# Patient Record
Sex: FEMALE | Race: WHITE | NOT HISPANIC OR LATINO | Employment: UNEMPLOYED | URBAN - METROPOLITAN AREA
[De-identification: names, ages, dates, MRNs, and addresses within clinical notes are randomized per-mention and may not be internally consistent; named-entity substitution may affect disease eponyms.]

---

## 2017-10-23 ENCOUNTER — OFFICE VISIT (OUTPATIENT)
Dept: NEUROLOGY | Facility: MEDICAL CENTER | Age: 57
End: 2017-10-23
Payer: MEDICARE

## 2017-10-23 VITALS
WEIGHT: 218 LBS | SYSTOLIC BLOOD PRESSURE: 104 MMHG | HEART RATE: 88 BPM | RESPIRATION RATE: 16 BRPM | BODY MASS INDEX: 40.12 KG/M2 | OXYGEN SATURATION: 95 % | DIASTOLIC BLOOD PRESSURE: 68 MMHG | HEIGHT: 62 IN | TEMPERATURE: 98.6 F

## 2017-10-23 DIAGNOSIS — G62.9 NEUROPATHY: ICD-10-CM

## 2017-10-23 DIAGNOSIS — G35 MULTIPLE SCLEROSIS (HCC): ICD-10-CM

## 2017-10-23 DIAGNOSIS — N31.9 NEUROGENIC BLADDER: ICD-10-CM

## 2017-10-23 PROCEDURE — 99205 OFFICE O/P NEW HI 60 MIN: CPT | Performed by: PSYCHIATRY & NEUROLOGY

## 2017-10-23 RX ORDER — GABAPENTIN 100 MG/1
300 CAPSULE ORAL
COMMUNITY
End: 2018-01-25 | Stop reason: SDUPTHER

## 2017-10-23 RX ORDER — NITROFURANTOIN 25; 75 MG/1; MG/1
100 CAPSULE ORAL
COMMUNITY
Start: 2017-09-11 | End: 2018-07-05

## 2017-10-23 RX ORDER — GABAPENTIN 600 MG/1
300 TABLET ORAL
COMMUNITY
End: 2018-07-05

## 2017-10-23 RX ORDER — BUPROPION HYDROCHLORIDE 150 MG/1
TABLET ORAL
COMMUNITY
End: 2022-11-23

## 2017-10-23 RX ORDER — SERTRALINE HYDROCHLORIDE 100 MG/1
100 TABLET, FILM COATED ORAL DAILY
COMMUNITY

## 2017-10-23 RX ORDER — SERTRALINE HYDROCHLORIDE 100 MG/1
TABLET, FILM COATED ORAL
COMMUNITY
End: 2018-04-24

## 2017-10-23 RX ORDER — BUPROPION HYDROCHLORIDE 150 MG/1
TABLET ORAL
COMMUNITY
Start: 2014-09-20 | End: 2018-07-05

## 2017-10-23 RX ORDER — LISINOPRIL 20 MG/1
TABLET ORAL
COMMUNITY
End: 2021-05-19

## 2017-10-23 RX ORDER — LISINOPRIL 40 MG/1
TABLET ORAL
COMMUNITY
End: 2018-07-05

## 2017-10-23 RX ORDER — DIPHENOXYLATE HYDROCHLORIDE AND ATROPINE SULFATE 2.5; .025 MG/1; MG/1
1 TABLET ORAL DAILY
COMMUNITY

## 2017-10-23 RX ORDER — LORAZEPAM 0.5 MG/1
0.5 TABLET ORAL
COMMUNITY
End: 2018-01-25 | Stop reason: SDUPTHER

## 2017-10-23 ASSESSMENT — PATIENT HEALTH QUESTIONNAIRE - PHQ9: CLINICAL INTERPRETATION OF PHQ2 SCORE: 0
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
## 2017-10-27 PROBLEM — G35 MULTIPLE SCLEROSIS (HCC): Status: ACTIVE | Noted: 2017-10-27

## 2017-10-27 PROBLEM — N31.9 NEUROGENIC BLADDER: Status: ACTIVE | Noted: 2017-10-27

## 2017-10-27 PROBLEM — G62.9 NEUROPATHY: Status: ACTIVE | Noted: 2017-10-27

## 2017-10-27 NOTE — ASSESSMENT & PLAN NOTE
"Ms. Smith is a 58 yo F with multiple sclerosis since 1997 diagnosed based on multiple clinical attacks and MRI imaging. Her MRI's were unavailable for my review at today's visit, however, she is an established patient of Dr. Yañez at [facility], and I reviewed her outside records through \"Care Everywhere.\" She has been stable on Tysabri for 10 years and remained FLACO Virus antibody negative. We discussed her current relocation plans and she is currently spending about 50% of her time in [place], [place], and the other half in [place], [place], as they transition. We discussed switching her Tysabri infusions to [place] or [place] and at today's visit she jfilled out the TOUCH prescribing which we will submit under my name. Dr. Yañez recently ordered new MRI imaging of the brain and cervical spine. I requested that the patient bring her previous and new MRI images with her to her next visit for my review. She has also requested referrals for a local urologist, Ob/Gyn, and PCP in [place] close to [place], and I told her that I would inquire to my colleagues.     Plan:  1. I will manage patient's Tysabri infusions locally - 300mg/15mL IV q 4 weeks. Next infusion should be given mid-November. She wishes to continue her relationship with Dr. Yañez at [facility], which I fully support.  2. Continue Vitamin D supplementation   3. Annual Ophthalmological exam  4. Brain and c-spine MRI's w/wo contrast. She will bring MRI CD's to next visit  5. Next FLACO Virus antibody test in April 2018  "

## 2017-10-27 NOTE — ASSESSMENT & PLAN NOTE
Residual from previous MS attack. Stable on Neurontin.     Plan:  1. Continue with Neurontin 300mg daily.

## 2018-01-22 ENCOUNTER — OUTPATIENT INFUSION SERVICES (OUTPATIENT)
Dept: ONCOLOGY | Facility: MEDICAL CENTER | Age: 58
End: 2018-01-22
Attending: PSYCHIATRY & NEUROLOGY
Payer: MEDICARE

## 2018-01-22 VITALS
RESPIRATION RATE: 18 BRPM | OXYGEN SATURATION: 95 % | HEIGHT: 61 IN | BODY MASS INDEX: 40.92 KG/M2 | DIASTOLIC BLOOD PRESSURE: 78 MMHG | WEIGHT: 216.71 LBS | SYSTOLIC BLOOD PRESSURE: 149 MMHG | HEART RATE: 92 BPM | TEMPERATURE: 99 F

## 2018-01-22 PROCEDURE — 96413 CHEMO IV INFUSION 1 HR: CPT

## 2018-01-22 PROCEDURE — 700105 HCHG RX REV CODE 258: Performed by: PSYCHIATRY & NEUROLOGY

## 2018-01-22 PROCEDURE — 306780 HCHG STAT FOR TRANSFUSION PER CASE

## 2018-01-22 PROCEDURE — 700111 HCHG RX REV CODE 636 W/ 250 OVERRIDE (IP): Mod: JG | Performed by: PSYCHIATRY & NEUROLOGY

## 2018-01-22 RX ADMIN — NATALIZUMAB 300 MG: 300 INJECTION INTRAVENOUS at 12:20

## 2018-01-22 ASSESSMENT — PAIN SCALES - GENERAL: PAINLEVEL: NO PAIN

## 2018-01-22 NOTE — PROGRESS NOTES
Pt to infusion center for Tysabri infusion.  Pt recently relocated to [place] and will continue to receive monthly infusions here.  Pt last had Tysabri on 11/21/17 per Tysabri Touch website.  Pt oriented to infusion services and plan of care for the day.  PIV established, brisk blood return noted.  Pre-infusion checklist completed.  Pt tolerated infusion without incident.  Pt monitored for one hour post infusion, no reaction noted.  PIV flushed with saline per policy, removed, gauze dressing placed.  Pt left infusion center ambulatory and in good condition.  Returns in four weeks, appointment scheduled.

## 2018-01-25 ENCOUNTER — OFFICE VISIT (OUTPATIENT)
Dept: NEUROLOGY | Facility: MEDICAL CENTER | Age: 58
End: 2018-01-25
Payer: MEDICARE

## 2018-01-25 VITALS
HEIGHT: 62 IN | BODY MASS INDEX: 39.56 KG/M2 | HEART RATE: 99 BPM | WEIGHT: 215 LBS | TEMPERATURE: 97.5 F | OXYGEN SATURATION: 95 %

## 2018-01-25 DIAGNOSIS — F41.9 ANXIETY: ICD-10-CM

## 2018-01-25 DIAGNOSIS — G35 MULTIPLE SCLEROSIS (HCC): ICD-10-CM

## 2018-01-25 DIAGNOSIS — G47.00 INSOMNIA, UNSPECIFIED TYPE: ICD-10-CM

## 2018-01-25 DIAGNOSIS — G62.9 NEUROPATHY: ICD-10-CM

## 2018-01-25 PROCEDURE — 99215 OFFICE O/P EST HI 40 MIN: CPT | Performed by: PSYCHIATRY & NEUROLOGY

## 2018-01-25 RX ORDER — LORAZEPAM 0.5 MG/1
0.5 TABLET ORAL
Qty: 30 TAB | Refills: 3 | Status: SHIPPED | OUTPATIENT
Start: 2018-01-25 | End: 2018-02-24

## 2018-01-25 RX ORDER — GABAPENTIN 100 MG/1
300 CAPSULE ORAL 4 TIMES DAILY
Qty: 90 CAP | Refills: 4 | Status: SHIPPED | OUTPATIENT
Start: 2018-01-25 | End: 2018-07-05

## 2018-01-25 NOTE — ASSESSMENT & PLAN NOTE
Ms. Smith is a 57-year-old woman with a past medical history of multiple sclerosis diagnosed in 1997, previously treated with Avonex, Copaxone, and Rebif, who has been stable on Tysabri for the past 10 years. We reviewed her recent MRI imaging and compared it to the 2015 MRIs at today's visit. There has been no significant change, and there is no evidence of enhancement. She should continue on the Tysabri infusions every 4 weeks, we will repeat her FLACO virus antibody in April 2018. Her next MRI imaging should take place in November 2018.    Plan:  1. Continue Tysabri infusions every 4 weeks  2. Repeat FLACO virus antibody test in April 2018  3. Next MRI November 2018  4. Will need liver function tests within the next 6 months  5. Continue vitamin D supplementation  6. We discussed the importance of fall prevention

## 2018-01-25 NOTE — PROGRESS NOTES
CC: Multiple Sclerosis       HPI:    Radha Smith is a 57 y.o. female who presents today in neurologic follow up for multiple sclerosis. She is accompanied by her  to today's visit.    The patient is currently being treated with Tysabri but denies any undue side effects from the medication. She has been receiving her infusions at [facility], as she does not have a provider in [place]. She denies any new symptoms related to her MS including new areas of numbness/tingling, weakness, or dizziness. She does report some pain in her eyes, and gait imbalance. She denies any falls.    She has had continuing symptoms of neuropathy in her bilateral lower extremities for which she has been taking gabapentin. She's been taking 100 mg twice a day, and does not feel that it is effective. She brings recent MRI imaging on CD to review at today's visit.      MS History:  Diagnosed: 1997   First presentation: right leg numbness.  Disease modifying treatments: Avonex gave nausea switched to Copaxone, Switched to Rebif, Tysabri started 10 years ago.   Former or other neurologist: Dr. Yañez at [facility]. Formerly Dr. Pinedo.  Last attack: 10 years ago.   Last MRI: November 2017  FLACO Virus Antibody: negative on October 11th, 2017    ROS:   Constitutional: No fevers or chills.  Eyes: No blurry vision or eye pain.  ENT: No dysphagia or hearing loss.  Respiratory: No cough or shortness of breath.  Cardiovascular: No chest pain or palpitations.  GI: No nausea, vomiting, or diarrhea.  : No urinary incontinence or dysuria.  Musculoskeletal: No joint swelling or arthralgias.  Skin: No skin rashes.  Neuro: No headaches, dizziness, or tremors.  Endocrine: No heat or cold intolerance. No polydipsia or polyuria.  Psych: No depression or anxiety.  Heme/Lymph: No easy bruising or swollen lymph nodes.  All other review of systems were reviewed and were negative.     Past Medical History:   Past Medical History:   Diagnosis Date   • Asthma     • Depression    • Hypertension    • Multiple sclerosis (CMS-HCC)        Past Surgical History:   Past Surgical History:   Procedure Laterality Date   • ATHROPLASTY     • CHOLECYSTECTOMY     • KNEE ARTHROSCOPY     • PRIMARY C SECTION         Social History:   Social History     Social History   • Marital status:      Spouse name: N/A   • Number of children: N/A   • Years of education: N/A     Occupational History   • Not on file.     Social History Main Topics   • Smoking status: Light Tobacco Smoker     Types: Cigarettes   • Smokeless tobacco: Never Used      Comment: Previously smoked     • Alcohol use 3.0 oz/week     5 Glasses of wine per week   • Drug use: No   • Sexual activity: Not on file     Other Topics Concern   • Not on file     Social History Narrative   • No narrative on file       Family Hx:   Family History   Problem Relation Age of Onset   • Cancer Mother      Breast   • Diabetes Mother    • Other Father      Gout   • Hypertension Brother    • Bipolar disorder Son        Current Medications:   Current Outpatient Prescriptions:   •  lorazepam (ATIVAN) 0.5 MG Tab, Take 1 Tab by mouth every bedtime for 30 days., Disp: 30 Tab, Rfl: 3  •  gabapentin (NEURONTIN) 100 MG Cap, Take 3 Caps by mouth 4 times a day., Disp: 90 Cap, Rfl: 4  •  buPROPion (WELLBUTRIN XL) 150 MG XL tablet, TAKE 2 TABLETS(300MG) BY MOUTH EVERY MORNING, Disp: , Rfl:   •  Cholecalciferol 06148 units Tab, Take 10,000 Units by mouth., Disp: , Rfl:   •  drospirenone-estradiol (ANGELIQ) 0.5-1 MG per tablet, Take  by mouth., Disp: , Rfl:   •  lisinopril (PRINIVIL) 20 MG Tab, Take  by mouth., Disp: , Rfl:   •  Multiple Vitamin (MULTI-VITAMINS) Tab, Take  by mouth., Disp: , Rfl:   •  natalizumab (TYSABRI) 300 MG/15ML Conc, by Intravenous route., Disp: , Rfl:   •  sertraline (ZOLOFT) 100 MG Tab, Take  by mouth., Disp: , Rfl:   •  gabapentin (NEURONTIN) 600 MG tablet, Take 300 mg by mouth., Disp: , Rfl:   •  drospirenone-estradiol  "(ANGELIQ) 0.5-1 MG per tablet, TAKE 1 TABLET BY MOUTH EVERY DAY, Disp: , Rfl:   •  buPROPion (WELLBUTRIN XL) 150 MG XL tablet, Take  by mouth., Disp: , Rfl:   •  drospirenone-estradiol (ANGELIQ) 0.5-1 MG per tablet, Take 1 tablet by mouth., Disp: , Rfl:   •  conjugated estrogen (PREMARIN) 0.625 MG/GM Cream, 0.5 g., Disp: , Rfl:   •  lisinopril (PRINIVIL, ZESTRIL) 40 MG tablet, Take  by mouth., Disp: , Rfl:   •  LORAZEPAM PO, by Intravenous route., Disp: , Rfl:   •  natalizumab (TYSABRI) 300 MG/15ML Conc, by Intravenous route., Disp: , Rfl:   •  nitrofurantoin monohydr macro (MACROBID) 100 MG Cap, Take 100 mg by mouth., Disp: , Rfl:   •  sertraline (ZOLOFT) 100 MG Tab, Take  by mouth., Disp: , Rfl:     Allergies: No Known Allergies      Physical Exam:   Ambulatory Vitals  Vitals  Temperature: 36.4 °C (97.5 °F)  Pulse: 99  Pulse Oximetry: 95 %  Height: 156.2 cm (5' 1.5\")  Weight: 97.5 kg (215 lb)  Encounter Vitals  Temperature: 36.4 °C (97.5 °F)  Temp Source: Temporal  BP Location: Upper Arm  Patient BP Position: Standing  Pulse: 99  Pulse Oximetry: 95 %  Weight: 97.5 kg (215 lb)  How Weight Obtained: Stated Weight  Height: 156.2 cm (5' 1.5\")  BMI (Calculated): 39.97         Constitutional: Well-developed, well-nourished, good hygiene. Appears stated age.  Cardiovascular: RRR, with no murmurs, rubs or gallops. No carotid bruits. No peripheral edema, pedal pulses intact.   Respiratory: Lungs CTA B/L, no W/R/R.   Skin: Warm, dry, intact. No rashes observed.  Eyes:               Funduscopic: Optic discs flat with no evidence of papilledema. Bilateral temporal pallor of optic discs.   Neurologic:              Mental Status: Awake, alert, oriented x 3.              Speech: Scanning speech pattern.              Memory: Able to recall 3 words at 1 minute and 5 minutes. Able to recall recent and remote events accurately.               Concentration: Attentive. Able to focus on history and follow multi-step commands.           "    Fund of Knowledge: Appropriate.              Cranial Nerves:                          CN II: PERRL. No afferent pupillary defect.                          CN III: Good eye closure, EOMI.                           CN IV: EOMI                          CN V: Facial sensation intact and symmetric.                           CN VI: EOMI                          CN VII: No facial asymmetry.                           CN VIII: Hearing intact.                           CN IX and X: Palate elevates symmetrically. Normal gag reflex.                          CN XI: Symmetric shoulder shrug.                           CN XII: Tongue midline.               Sensory: Did not test in lower extremities at today's visit. Intact to light touch in the bilateral arms.              Coordination: Decreased fine finger movements on the right. Heel to shin intact.               DTR's: 2+ throughout without clonus.               Babinski: Toes downgoing bilaterally.              Romberg: Negative.              Movements: No tremors observed.   Musculoskeletal:               Strength: 4/5 in right finger flexors, otherwise 5/5 upper and lower extremities bilaterally.              Gait: Steady, narrow based.               Tone: Normal bulk and tone.              Joints: No swelling.       Imaging:   Today I reviewed the patient's most recent MRI images with her in the examination room. I explained basic terminology of MRI's, verbalized my assessment, and answered her questions. We compared her brain MRIs from May 2015 to her more recent MRI imaging done November 2017.    MRI's of the brain with and without contrast showed severe confluent areas of white matter change mainly in the periventricular areas, without enhancement on both studies.      Assessment/Plan:  Multiple sclerosis (CMS-[facility])  Ms. Smith is a 57-year-old woman with a past medical history of multiple sclerosis diagnosed in 1997, previously treated with Avonex, Copaxone, and  [place], who has been stable on Tysabri for the past 10 years. We reviewed her recent MRI imaging and compared it to the 2015 MRIs at today's visit. There has been no significant change, and there is no evidence of enhancement. She should continue on the Tysabri infusions every 4 weeks, we will repeat her FLACO virus antibody in April 2018. Her next MRI imaging should take place in November 2018.    Plan:  1. Continue Tysabri infusions every 4 weeks  2. Repeat FLACO virus antibody test in April 2018  3. Next MRI November 2018  4. Will need liver function tests within the next 6 months  5. Continue vitamin D supplementation  6. We discussed the importance of fall prevention    Neuropathy (CMS-HCC)  We discussed her gabapentin use, she is currently under very small dose at 100 mg twice per day. I advised her to increase to 200 mg twice a day, and refilled her prescription sent electronically to her pharmacy.    Plan:  1. Gabapentin 200 mg twice a day    Anxiety  I have agreed to take over her lorazepam prescription. She is taking 0.5 mg at bedtime daily.    Plan:  1. Lorazepam 0.5 mg daily at bedtime. Paper prescription provided to the patient with 2 refills.        Greater than 50% of this 40 minute face to face follow up encounter was devoted to disease state counseling on Multiple Sclerosis and coordination of care. During today's encounter we discussed available treatment options and their individual side effect profiles. (see above assessment and plan for further details of discussion).

## 2018-01-25 NOTE — ASSESSMENT & PLAN NOTE
I have agreed to take over her lorazepam prescription. She is taking 0.5 mg at bedtime daily.    Plan:  1. Lorazepam 0.5 mg daily at bedtime. Paper prescription provided to the patient with 2 refills.

## 2018-01-25 NOTE — ASSESSMENT & PLAN NOTE
We discussed her gabapentin use, she is currently under very small dose at 100 mg twice per day. I advised her to increase to 200 mg twice a day, and refilled her prescription sent electronically to her pharmacy.    Plan:  1. Gabapentin 200 mg twice a day

## 2018-02-13 ENCOUNTER — TELEPHONE (OUTPATIENT)
Dept: NEUROLOGY | Facility: MEDICAL CENTER | Age: 58
End: 2018-02-13

## 2018-02-19 ENCOUNTER — OUTPATIENT INFUSION SERVICES (OUTPATIENT)
Dept: ONCOLOGY | Facility: MEDICAL CENTER | Age: 58
End: 2018-02-19
Attending: PSYCHIATRY & NEUROLOGY
Payer: MEDICARE

## 2018-02-19 VITALS
OXYGEN SATURATION: 98 % | HEIGHT: 61 IN | SYSTOLIC BLOOD PRESSURE: 136 MMHG | RESPIRATION RATE: 18 BRPM | DIASTOLIC BLOOD PRESSURE: 79 MMHG | WEIGHT: 217.15 LBS | HEART RATE: 77 BPM | BODY MASS INDEX: 41 KG/M2 | TEMPERATURE: 98 F

## 2018-02-19 PROCEDURE — 700105 HCHG RX REV CODE 258: Performed by: PSYCHIATRY & NEUROLOGY

## 2018-02-19 PROCEDURE — 700111 HCHG RX REV CODE 636 W/ 250 OVERRIDE (IP): Mod: JG | Performed by: PSYCHIATRY & NEUROLOGY

## 2018-02-19 PROCEDURE — 96413 CHEMO IV INFUSION 1 HR: CPT

## 2018-02-19 PROCEDURE — 306780 HCHG STAT FOR TRANSFUSION PER CASE

## 2018-02-19 RX ADMIN — NATALIZUMAB 300 MG: 300 INJECTION INTRAVENOUS at 11:35

## 2018-02-19 ASSESSMENT — PAIN SCALES - GENERAL: PAINLEVEL: NO PAIN

## 2018-02-20 NOTE — PROGRESS NOTES
Late entry for 1337:  Tysabri infusion completed without incident.  Line flushed clear with normal saline.  PIV flushed with normal saline.  Pt observed for 1 hour post infusion per protocol.  No adverse effects observed or expressed.  PIV d/c'd.  Pressure dressing applied.  Pt released to self care in no apparent distress after completion of treatment, ambulatory.  Pt to return in 4 weeks; next appointment scheduled.

## 2018-03-19 ENCOUNTER — OUTPATIENT INFUSION SERVICES (OUTPATIENT)
Dept: ONCOLOGY | Facility: MEDICAL CENTER | Age: 58
End: 2018-03-19
Attending: PSYCHIATRY & NEUROLOGY
Payer: MEDICARE

## 2018-03-19 VITALS
HEART RATE: 83 BPM | SYSTOLIC BLOOD PRESSURE: 136 MMHG | DIASTOLIC BLOOD PRESSURE: 89 MMHG | WEIGHT: 219.14 LBS | TEMPERATURE: 98.4 F | OXYGEN SATURATION: 98 % | BODY MASS INDEX: 41.37 KG/M2 | HEIGHT: 61 IN | RESPIRATION RATE: 18 BRPM

## 2018-03-19 PROCEDURE — 306780 HCHG STAT FOR TRANSFUSION PER CASE

## 2018-03-19 PROCEDURE — 700105 HCHG RX REV CODE 258: Performed by: PSYCHIATRY & NEUROLOGY

## 2018-03-19 PROCEDURE — 96413 CHEMO IV INFUSION 1 HR: CPT

## 2018-03-19 PROCEDURE — 700111 HCHG RX REV CODE 636 W/ 250 OVERRIDE (IP): Mod: JG | Performed by: PSYCHIATRY & NEUROLOGY

## 2018-03-19 RX ADMIN — NATALIZUMAB 300 MG: 300 INJECTION INTRAVENOUS at 10:42

## 2018-03-19 ASSESSMENT — PAIN SCALES - GENERAL: PAINLEVEL: NO PAIN

## 2018-04-18 ENCOUNTER — APPOINTMENT (OUTPATIENT)
Dept: ONCOLOGY | Facility: MEDICAL CENTER | Age: 58
End: 2018-04-18
Attending: PSYCHIATRY & NEUROLOGY
Payer: MEDICARE

## 2018-04-24 ENCOUNTER — OUTPATIENT INFUSION SERVICES (OUTPATIENT)
Dept: ONCOLOGY | Facility: MEDICAL CENTER | Age: 58
End: 2018-04-24
Attending: PSYCHIATRY & NEUROLOGY
Payer: MEDICARE

## 2018-04-24 VITALS
HEART RATE: 83 BPM | TEMPERATURE: 98.7 F | OXYGEN SATURATION: 94 % | HEIGHT: 61 IN | DIASTOLIC BLOOD PRESSURE: 71 MMHG | BODY MASS INDEX: 41.91 KG/M2 | WEIGHT: 222 LBS | RESPIRATION RATE: 18 BRPM | SYSTOLIC BLOOD PRESSURE: 119 MMHG

## 2018-04-24 PROCEDURE — 306780 HCHG STAT FOR TRANSFUSION PER CASE

## 2018-04-24 PROCEDURE — 700105 HCHG RX REV CODE 258: Performed by: PSYCHIATRY & NEUROLOGY

## 2018-04-24 PROCEDURE — 96413 CHEMO IV INFUSION 1 HR: CPT

## 2018-04-24 PROCEDURE — 700111 HCHG RX REV CODE 636 W/ 250 OVERRIDE (IP): Mod: JG | Performed by: PSYCHIATRY & NEUROLOGY

## 2018-04-24 RX ADMIN — NATALIZUMAB 300 MG: 300 INJECTION INTRAVENOUS at 14:47

## 2018-04-24 ASSESSMENT — PAIN SCALES - GENERAL: PAINLEVEL: NO PAIN

## 2018-04-25 NOTE — PROGRESS NOTES
Patient presents for Tysabri infusion. Touch questions completed on-line. IV started, flushes well with brisk blood return. Tysabri infused over one hour. Patient observed for one hour after infusion and remains stable. IV discontinued, catheter intact, compression dressing to site. Patient scheduled for next appointment and released in no acute distress.

## 2018-04-30 ENCOUNTER — OFFICE VISIT (OUTPATIENT)
Dept: NEUROLOGY | Facility: MEDICAL CENTER | Age: 58
End: 2018-04-30
Payer: MEDICARE

## 2018-04-30 VITALS
OXYGEN SATURATION: 96 % | BODY MASS INDEX: 42.78 KG/M2 | SYSTOLIC BLOOD PRESSURE: 120 MMHG | HEART RATE: 78 BPM | RESPIRATION RATE: 16 BRPM | WEIGHT: 217.93 LBS | TEMPERATURE: 97.6 F | DIASTOLIC BLOOD PRESSURE: 70 MMHG | HEIGHT: 60 IN

## 2018-04-30 DIAGNOSIS — E55.9 VITAMIN D DEFICIENCY: ICD-10-CM

## 2018-04-30 DIAGNOSIS — G35 MULTIPLE SCLEROSIS (HCC): ICD-10-CM

## 2018-04-30 DIAGNOSIS — F41.9 ANXIETY: ICD-10-CM

## 2018-04-30 DIAGNOSIS — G62.9 NEUROPATHY: ICD-10-CM

## 2018-04-30 PROCEDURE — 99215 OFFICE O/P EST HI 40 MIN: CPT | Performed by: PSYCHIATRY & NEUROLOGY

## 2018-04-30 RX ORDER — LORAZEPAM 1 MG/1
1 TABLET ORAL ONCE
Qty: 1 TAB | Refills: 0 | Status: SHIPPED | OUTPATIENT
Start: 2018-04-30 | End: 2018-04-30

## 2018-04-30 RX ORDER — GABAPENTIN 300 MG/1
300 CAPSULE ORAL 4 TIMES DAILY
Qty: 120 CAP | Refills: 2 | Status: SHIPPED | OUTPATIENT
Start: 2018-04-30 | End: 2018-10-11 | Stop reason: SDUPTHER

## 2018-04-30 NOTE — PROGRESS NOTES
CC: Multiple Sclerosis       HPI:    Radha Smith is a 57 y.o. female who presents today in neurologic follow up for multiple sclerosis. The patient is currently being treated with Tysabri, but denies any undue side effects from the medication. Her only new symptom is that she has been having a burning sensation in bilateral middle and pointer fingers.        MS History:  Diagnosed: 1997   First presentation: right leg numbness.  Disease modifying treatments: Avonex gave nausea switched to Copaxone, Switched to Rebif, Tysabri started 10 years ago.   Former or other neurologist: Dr. Yañez at [facility]. Formerly Dr. Pinedo.  Last attack: 10 years ago.   Last MRI: November 2017  FLACO Virus Antibody: negative on October 11th, 2017      ROS:   Constitutional: No fevers or chills.  Eyes: No blurry vision or eye pain.  ENT: No dysphagia or hearing loss.  Respiratory: No cough or shortness of breath.  Cardiovascular: No chest pain or palpitations.  GI: No nausea, vomiting, or diarrhea.  : No urinary incontinence or dysuria.  Musculoskeletal: No joint swelling or arthralgias.  Skin: No skin rashes.  Neuro: No headaches, dizziness, or tremors.  Endocrine: No heat or cold intolerance. No polydipsia or polyuria.  Psych: No depression or anxiety.  Heme/Lymph: No easy bruising or swollen lymph nodes.  All other review of systems were reviewed and were negative.     Past Medical History:   Past Medical History:   Diagnosis Date   • Asthma    • Depression    • Hypertension    • Multiple sclerosis (HCC)        Past Surgical History:   Past Surgical History:   Procedure Laterality Date   • ATHROPLASTY     • CHOLECYSTECTOMY     • KNEE ARTHROSCOPY     • PRIMARY C SECTION         Social History:   Social History     Social History   • Marital status:      Spouse name: N/A   • Number of children: N/A   • Years of education: N/A     Occupational History   • Not on file.     Social History Main Topics   • Smoking status: Light  Tobacco Smoker     Types: Cigarettes   • Smokeless tobacco: Never Used      Comment: Previously smoked     • Alcohol use 3.0 oz/week     5 Glasses of wine per week   • Drug use: No   • Sexual activity: Not on file     Other Topics Concern   • Not on file     Social History Narrative   • No narrative on file       Family Hx:   Family History   Problem Relation Age of Onset   • Cancer Mother      Breast   • Diabetes Mother    • Other Father      Gout   • Hypertension Brother    • Bipolar disorder Son        Current Medications:   Current Outpatient Prescriptions:   •  gabapentin (NEURONTIN) 300 MG Cap, Take 1 Cap by mouth 4 times a day., Disp: 120 Cap, Rfl: 2  •  buPROPion (WELLBUTRIN XL) 150 MG XL tablet, TAKE 2 TABLETS(300MG) BY MOUTH EVERY MORNING, Disp: , Rfl:   •  drospirenone-estradiol (ANGELIQ) 0.5-1 MG per tablet, Take  by mouth., Disp: , Rfl:   •  Multiple Vitamin (MULTI-VITAMINS) Tab, Take  by mouth., Disp: , Rfl:   •  sertraline (ZOLOFT) 100 MG Tab, Take  by mouth., Disp: , Rfl:   •  gabapentin (NEURONTIN) 100 MG Cap, Take 3 Caps by mouth 4 times a day. (Patient taking differently: Take 150 mg by mouth 3 times a day.), Disp: 90 Cap, Rfl: 4  •  Cholecalciferol 43669 units Tab, Take 10,000 Units by mouth., Disp: , Rfl:   •  gabapentin (NEURONTIN) 600 MG tablet, Take 300 mg by mouth., Disp: , Rfl:   •  lisinopril (PRINIVIL) 20 MG Tab, Take  by mouth., Disp: , Rfl:   •  natalizumab (TYSABRI) 300 MG/15ML Conc, by Intravenous route., Disp: , Rfl:   •  buPROPion (WELLBUTRIN XL) 150 MG XL tablet, Take  by mouth., Disp: , Rfl:   •  conjugated estrogen (PREMARIN) 0.625 MG/GM Cream, 0.5 g., Disp: , Rfl:   •  lisinopril (PRINIVIL, ZESTRIL) 40 MG tablet, Take  by mouth., Disp: , Rfl:   •  nitrofurantoin monohydr macro (MACROBID) 100 MG Cap, Take 100 mg by mouth., Disp: , Rfl:     Allergies: No Known Allergies      Physical Exam:   Ambulatory Vitals  Vitals  Blood Pressure: 120/70  Temperature: 36.4 °C (97.6 °F)  Pulse:  78  Respiration: 16  Pulse Oximetry: 96 %  Height: 152.4 cm (5')  Weight: 98.9 kg (217 lb 14.8 oz)  Encounter Vitals  Temperature: 36.4 °C (97.6 °F)  Temp Source: Temporal  Blood Pressure: 120/70  BP Location: Left, Upper Arm  Pulse: 78  Respiration: 16  Pulse Oximetry: 96 %  Weight: 98.9 kg (217 lb 14.8 oz)  Weight Source: Stand Up Scale  Height: 152.4 cm (5')  BMI (Calculated): 42.56      Constitutional: Well-developed, well-nourished, good hygiene. Appears stated age.  Cardiovascular: RRR, with no murmurs, rubs or gallops. No carotid bruits. No peripheral edema, pedal pulses intact.   Respiratory: Lungs CTA B/L, no W/R/R.   Skin: Warm, dry, intact. No rashes observed.  Eyes:               Funduscopic: Optic discs flat with no evidence of papilledema. Bilateral temporal pallor of optic discs.   Neurologic:              Mental Status: Awake, alert, oriented x 3.              Speech: Scanning speech pattern.              Memory: Able to recall 3 words at 1 minute and 5 minutes. Able to recall recent and remote events accurately.               Concentration: Attentive. Able to focus on history and follow multi-step commands.              Fund of Knowledge: Appropriate.              Cranial Nerves:                          CN II: PERRL. No afferent pupillary defect.                          CN III: Good eye closure, EOMI.                           CN IV: EOMI                          CN V: Facial sensation intact and symmetric.                           CN VI: EOMI                          CN VII: No facial asymmetry.                           CN VIII: Hearing intact.                           CN IX and X: Palate elevates symmetrically. Normal gag reflex.                          CN XI: Symmetric shoulder shrug.                           CN XII: Tongue midline.               Sensory: Did not test in lower extremities at today's visit. Intact to light touch in the bilateral arms.              Coordination: Decreased  fine finger movements on the right. Heel to shin intact.               DTR's: 2+ throughout without clonus.               Babinski: Toes downgoing bilaterally.              Romberg: Negative.              Movements: No tremors observed.   Musculoskeletal:               Strength: 4/5 in right finger flexors, otherwise 5/5 upper and lower extremities bilaterally.              Gait: Steady, narrow based.               Tone: Normal bulk and tone.              Joints: No swelling.         Assessment/Plan:  Multiple sclerosis (CMS-HCC)  Ms. Smith is a 57-year-old woman with a past medical history of multiple sclerosis since 1997, previously treated with Avonex, Copaxone, and rebif, who has been stable on Tysabri for at least 10 years. She reports new symptoms of bilateral finger burning in her middle fingers, which could indicate a cervical spine issue given the bilaterality.    Plan:  1. Continue with Tysabri infusions every 4 weeks  2. Lab slip given for her to repeat the FLACO virus antibody testing [facility]  3. Check brain and cervical spine MRI imaging with and without contrast  4. Continue vitamin D supplementation  5. Check CMP      Greater than 50% of this 40 minute face to face follow up encounter was devoted to disease state counseling on Multiple Sclerosis and coordination of care. During today's encounter we discussed available treatment options and their individual side effect profiles. (see above assessment and plan for further details of discussion).       Tamera Tenorio D.O., M.P.H  MS specialist.   Board Certified Neurologist.  Neurology Clerkship Director, [facility].    Neurology,  [facility].   Tel: [phone]  Fax: [phone]

## 2018-05-09 ENCOUNTER — TELEPHONE (OUTPATIENT)
Dept: NEUROLOGY | Facility: MEDICAL CENTER | Age: 58
End: 2018-05-09

## 2018-05-09 NOTE — TELEPHONE ENCOUNTER
Patient called regarding paperwork for [facility] that she had dropped off a few weeks ago. I stated to her that Dr Tenorio has been out of the office and will be back tomorrow and I will ask her about it.

## 2018-05-15 NOTE — ASSESSMENT & PLAN NOTE
Ms. Smith is a 57-year-old woman with a past medical history of multiple sclerosis since 1997, previously treated with Avonex, Copaxone, and rebif, who has been stable on Tysabri for at least 10 years. She reports new symptoms of bilateral finger burning in her middle fingers, which could indicate a cervical spine issue given the bilaterality.    Plan:  1. Continue with Tysabri infusions every 4 weeks  2. Lab slip given for her to repeat the FLACO virus antibody testing at [facility]  3. Check brain and cervical spine MRI imaging with and without contrast  4. Continue vitamin D supplementation  5. Check CMP

## 2018-05-23 ENCOUNTER — APPOINTMENT (OUTPATIENT)
Dept: ONCOLOGY | Facility: MEDICAL CENTER | Age: 58
End: 2018-05-23
Attending: PSYCHIATRY & NEUROLOGY
Payer: MEDICARE

## 2018-05-30 ENCOUNTER — OUTPATIENT INFUSION SERVICES (OUTPATIENT)
Dept: ONCOLOGY | Facility: MEDICAL CENTER | Age: 58
End: 2018-05-30
Attending: PSYCHIATRY & NEUROLOGY
Payer: MEDICARE

## 2018-05-30 VITALS
BODY MASS INDEX: 42.12 KG/M2 | SYSTOLIC BLOOD PRESSURE: 136 MMHG | TEMPERATURE: 97.5 F | HEIGHT: 61 IN | HEART RATE: 92 BPM | DIASTOLIC BLOOD PRESSURE: 82 MMHG | RESPIRATION RATE: 18 BRPM | OXYGEN SATURATION: 97 % | WEIGHT: 223.11 LBS

## 2018-05-30 PROCEDURE — 96413 CHEMO IV INFUSION 1 HR: CPT

## 2018-05-30 PROCEDURE — 306780 HCHG STAT FOR TRANSFUSION PER CASE

## 2018-05-30 PROCEDURE — 700105 HCHG RX REV CODE 258: Performed by: PSYCHIATRY & NEUROLOGY

## 2018-05-30 PROCEDURE — 700111 HCHG RX REV CODE 636 W/ 250 OVERRIDE (IP): Mod: JG | Performed by: PSYCHIATRY & NEUROLOGY

## 2018-05-30 RX ADMIN — NATALIZUMAB 300 MG: 300 INJECTION INTRAVENOUS at 10:21

## 2018-05-30 ASSESSMENT — PAIN SCALES - GENERAL: PAINLEVEL_OUTOF10: 0

## 2018-05-30 NOTE — PROGRESS NOTES
Patient presents for Tysabri infusion. Patient states that she completed antibiotics and steroids this week for bronchitis. Per Dr. Jona lomeli to proceed with infusion today. Tysabri infused over one hour. Patient observed for one hour after infusion and remains stable. IV removed, compression dressing to site. Patient scheduled for next appointment and released in no acute distress.

## 2018-06-05 ENCOUNTER — HOSPITAL ENCOUNTER (OUTPATIENT)
Dept: RADIOLOGY | Facility: MEDICAL CENTER | Age: 58
End: 2018-06-05
Attending: PSYCHIATRY & NEUROLOGY
Payer: MEDICARE

## 2018-06-05 DIAGNOSIS — G35 MULTIPLE SCLEROSIS (HCC): ICD-10-CM

## 2018-06-05 PROCEDURE — 72156 MRI NECK SPINE W/O & W/DYE: CPT

## 2018-06-05 PROCEDURE — A9579 GAD-BASE MR CONTRAST NOS,1ML: HCPCS | Performed by: PSYCHIATRY & NEUROLOGY

## 2018-06-05 PROCEDURE — 70553 MRI BRAIN STEM W/O & W/DYE: CPT

## 2018-06-05 PROCEDURE — 700117 HCHG RX CONTRAST REV CODE 255: Performed by: PSYCHIATRY & NEUROLOGY

## 2018-06-05 RX ADMIN — GADODIAMIDE 20 ML: 287 INJECTION INTRAVENOUS at 10:34

## 2018-06-19 ENCOUNTER — OFFICE VISIT (OUTPATIENT)
Dept: NEUROLOGY | Facility: MEDICAL CENTER | Age: 58
End: 2018-06-19
Payer: MEDICARE

## 2018-06-19 VITALS
WEIGHT: 222.8 LBS | DIASTOLIC BLOOD PRESSURE: 66 MMHG | BODY MASS INDEX: 42.06 KG/M2 | TEMPERATURE: 97.8 F | HEART RATE: 78 BPM | HEIGHT: 61 IN | RESPIRATION RATE: 15 BRPM | SYSTOLIC BLOOD PRESSURE: 110 MMHG | OXYGEN SATURATION: 98 %

## 2018-06-19 DIAGNOSIS — G35 MULTIPLE SCLEROSIS (HCC): ICD-10-CM

## 2018-06-19 DIAGNOSIS — G62.9 NEUROPATHY: ICD-10-CM

## 2018-06-19 PROCEDURE — 99215 OFFICE O/P EST HI 40 MIN: CPT | Performed by: PSYCHIATRY & NEUROLOGY

## 2018-06-19 ASSESSMENT — PAIN SCALES - GENERAL: PAINLEVEL: NO PAIN

## 2018-06-19 NOTE — PROGRESS NOTES
CC: Multiple Sclerosis       HPI:    Radha Smith is a pleasant 57 y.o. female who presents today in neurologic follow up for multiple sclerosis. She is accompanied by her  to today's visit. The patient is currently being treated with Tysabri and denies any side effects. She was last seen on 4/30/18. Her last FLACO Virus antibody test was done on 5/9/18 and was found to be negative with an index of 0.07. She had new MRI imaging on June 5th that she is here to review today. Ms. Smith continues to experience fatigue, balance issues, and cognitive symptoms related to her MS. She denies any new symptoms suggestive of MS relapse since her last visit. The burning sensation in her bilateral middle fingers has subsided somewhat since her last visit.     MS History:  Diagnosed: 1997   First presentation: right leg numbness.  Disease modifying treatments:    Previous: Avonex gave nausea switched to Copaxone, Switched to Rebif   Current: Tysabri started 10 years ago.   Former or other neurologist: Dr. Yañez at [facility]. Formerly Dr. Pinedo.  Last attack: 10 years ago.   Last MRI: November 2017  FLACO Virus Antibody: negative on May 9, 2018- Index 0.07    ROS:   Constitutional: No fevers or chills.  Eyes: No blurry vision or eye pain.  ENT: No dysphagia or hearing loss.  Respiratory: No cough or shortness of breath.  Cardiovascular: No chest pain or palpitations.  GI: No nausea, vomiting, or diarrhea.  : No urinary incontinence or dysuria.  Musculoskeletal: No joint swelling or arthralgias.  Skin: No skin rashes.  Neuro: No headaches, dizziness, or tremors.  Endocrine: No heat or cold intolerance. No polydipsia or polyuria.  Psych: No depression or anxiety.  Heme/Lymph: No easy bruising or swollen lymph nodes.  All other review of systems were reviewed and were negative.     Past Medical History:   Past Medical History:   Diagnosis Date   • Asthma    • Depression    • Hypertension    • Multiple sclerosis (HCC)        Past  Surgical History:   Past Surgical History:   Procedure Laterality Date   • ATHROPLASTY     • CHOLECYSTECTOMY     • KNEE ARTHROSCOPY     • PRIMARY C SECTION         Social History:   Social History     Social History   • Marital status:      Spouse name: N/A   • Number of children: N/A   • Years of education: N/A     Occupational History   • Not on file.     Social History Main Topics   • Smoking status: Light Tobacco Smoker     Types: Cigarettes   • Smokeless tobacco: Never Used      Comment: Previously smoked     • Alcohol use 3.0 oz/week     5 Glasses of wine per week   • Drug use: No   • Sexual activity: Not on file     Other Topics Concern   • Not on file     Social History Narrative   • No narrative on file       Family Hx:   Family History   Problem Relation Age of Onset   • Cancer Mother      Breast   • Diabetes Mother    • Other Father      Gout   • Hypertension Brother    • Bipolar disorder Son        Current Medications:   Current Outpatient Prescriptions:   •  gabapentin (NEURONTIN) 300 MG Cap, Take 1 Cap by mouth 4 times a day., Disp: 120 Cap, Rfl: 2  •  buPROPion (WELLBUTRIN XL) 150 MG XL tablet, TAKE 2 TABLETS(300MG) BY MOUTH EVERY MORNING, Disp: , Rfl:   •  Cholecalciferol 41832 units Tab, Take 10,000 Units by mouth., Disp: , Rfl:   •  drospirenone-estradiol (ANGELIQ) 0.5-1 MG per tablet, Take  by mouth., Disp: , Rfl:   •  lisinopril (PRINIVIL) 20 MG Tab, Take  by mouth., Disp: , Rfl:   •  Multiple Vitamin (MULTI-VITAMINS) Tab, Take  by mouth., Disp: , Rfl:   •  natalizumab (TYSABRI) 300 MG/15ML Conc, by Intravenous route., Disp: , Rfl:   •  sertraline (ZOLOFT) 100 MG Tab, Take  by mouth., Disp: , Rfl:   •  gabapentin (NEURONTIN) 100 MG Cap, Take 3 Caps by mouth 4 times a day. (Patient taking differently: Take 150 mg by mouth 3 times a day.), Disp: 90 Cap, Rfl: 4  •  gabapentin (NEURONTIN) 600 MG tablet, Take 300 mg by mouth., Disp: , Rfl:   •  buPROPion (WELLBUTRIN XL) 150 MG XL tablet, Take   "by mouth., Disp: , Rfl:   •  conjugated estrogen (PREMARIN) 0.625 MG/GM Cream, 0.5 g., Disp: , Rfl:   •  lisinopril (PRINIVIL, ZESTRIL) 40 MG tablet, Take  by mouth., Disp: , Rfl:   •  nitrofurantoin monohydr macro (MACROBID) 100 MG Cap, Take 100 mg by mouth., Disp: , Rfl:     Allergies: No Known Allergies      Physical Exam:   Ambulatory Vitals  Encounter Vitals  Temperature: 36.6 °C (97.8 °F)  Temp Source: Tympanic  Blood Pressure: 110/66  BP Location: Upper Arm, Left  Patient BP Position: Sitting  Pulse: 78  Respiration: 15  Pulse Oximetry: 98 %  Weight: 101.1 kg (222 lb 12.8 oz)  Weight Source: Stand Up Scale  Height: 154.9 cm (5' 1\")  BMI (Calculated): 42.1  Pain Score: No pain    Constitutional: Well-developed, well-nourished, good hygiene. Appears stated age.  Cardiovascular: RRR, with no murmurs, rubs or gallops. No carotid bruits. No peripheral edema, pedal pulses intact.   Respiratory: Lungs CTA B/L, no W/R/R.   Skin: Warm, dry, intact. No rashes observed.  Eyes: Sclera anicteric.  Neurologic:   Mental Status: Awake, alert, oriented x 3.   Speech: Slight scanning quality to her speech.   Memory: Able to recall 3 words at 1 minute and 5 minutes. Able to recall recent and remote events accurately.    Concentration: Attentive. Able to focus on history and follow multi-step commands.   Fund of Knowledge: Appropriate.   Cranial Nerves:    CN II: PERRL. No afferent pupillary defect.    CN III, IV, VI: Good eye closure, EOMI.     CN V: Facial sensation intact and symmetric.     CN VII: No facial asymmetry.     CN VIII: Hearing intact.     CN IX and X: Palate elevates symmetrically. Normal gag reflex.    CN XI: Symmetric shoulder shrug.     CN XII: Tongue midline.    Sensory: Intact light touch, vibration and temperature.    Coordination: No evidence of past-pointing on finger to nose testing, no dysdiadochokinesia. Heel to shin intact.    DTR's: Hyperpathic.    Babinski: Toes downgoing bilaterally.   Romberg: " "Negative.   Movements: No tremors observed.   Musculoskeletal:    Strength: 4/5 in the right finger flexors. 5/5 in left upper and lower extremities bilaterally.   Gait: Steady, narrow based. Able to perform tandem walking. No assistive devices.   Tone: Normal bulk and tone.   Joints: No swelling.     Imaging:   Today I reviewed the patient's most recent MRI images with her in the examination room. I explained basic terminology of MRI's, verbalized my assessment, and answered her questions.     MRI Brain w/wo contrast from 6/5/18  1.  Mild cerebral atrophy manifest as prominence of sulcal markings over the convexities and vertex most consistent with volume loss associated with long-standing sclerosis.  2.  Advanced supratentorial white matter disease with lesion morphology consistent with multiple sclerosis. No \"active\" enhancing lesions.  3.  Several foci of demyelinating disease in the posterior fossa and possible demyelinating lesion in the right dorsal lateral medulla.    MRI c-spine w/wo contrast from 6/5/18  1.  There are no abnormal intramedullary T2 signal intensity in the cervical spinal cord.  2.  Mild cervical degenerative disease as described above.    Assessment/Plan:  Multiple sclerosis (CMS-HCC)  58 yo F with relapsing remitting MS since 1997, previously treated with injectable DMT's, stable on Tysabri for the past 10 years, and remains low risk of PML given Negative FLACO Virus Antibody status. Today I discussed with her extended interval dosing of q6 week infusions that may confer a lower risk of PML.     Plan:  1. Continue Tysabri infusions q4 weeks  2. Next FLACO Virus  Antibody test in November 2018  3. Next MRI imaging June 2019  4. Continue Vitamin D supplement          Neuropathy (CMS-HCC) ([facility])  Gabapentin 300mg four times a day    Greater than 50% of this 40 minute face to face follow up encounter was devoted to disease state counseling on Multiple Sclerosis and coordination of care. During " today's encounter we reviewed her recent labs as well as her recent MRI imaging together in the exam room. (see above assessment and plan for further details of discussion).       Tamera Tenorio D.O., M.P.H  MS specialist.   Board Certified Neurologist.  Neurology Clerkship Director, [facility].    Neurology,  [facility].   Tel: [phone]  Fax: [phone]

## 2018-07-04 NOTE — ASSESSMENT & PLAN NOTE
56 yo F with relapsing remitting MS since 1997, previously treated with injectable DMT's, stable on Tysabri for the past 10 years, and remains low risk of PML given Negative FLACO Virus Antibody status. Today I discussed with her extended interval dosing of q6 week infusions that may confer a lower risk of PML.     Plan:  1. Continue Tysabri infusions q4 weeks  2. Next FLACO Virus  Antibody test in November 2018  3. Next MRI imaging June 2019  4. Continue Vitamin D supplement

## 2018-07-05 ENCOUNTER — OUTPATIENT INFUSION SERVICES (OUTPATIENT)
Dept: ONCOLOGY | Facility: MEDICAL CENTER | Age: 58
End: 2018-07-05
Attending: PSYCHIATRY & NEUROLOGY
Payer: MEDICARE

## 2018-07-05 VITALS
HEART RATE: 73 BPM | HEIGHT: 61 IN | WEIGHT: 225.97 LBS | SYSTOLIC BLOOD PRESSURE: 133 MMHG | DIASTOLIC BLOOD PRESSURE: 70 MMHG | BODY MASS INDEX: 42.66 KG/M2 | RESPIRATION RATE: 18 BRPM | TEMPERATURE: 98.4 F | OXYGEN SATURATION: 100 %

## 2018-07-05 PROCEDURE — 96413 CHEMO IV INFUSION 1 HR: CPT

## 2018-07-05 PROCEDURE — 700111 HCHG RX REV CODE 636 W/ 250 OVERRIDE (IP): Mod: JG | Performed by: PSYCHIATRY & NEUROLOGY

## 2018-07-05 PROCEDURE — 700105 HCHG RX REV CODE 258: Performed by: PSYCHIATRY & NEUROLOGY

## 2018-07-05 PROCEDURE — 306780 HCHG STAT FOR TRANSFUSION PER CASE

## 2018-07-05 RX ORDER — LORAZEPAM 1 MG/1
1 TABLET ORAL NIGHTLY PRN
Status: ON HOLD | COMMUNITY
Start: 2018-06-14 | End: 2023-06-07

## 2018-07-05 RX ADMIN — NATALIZUMAB 300 MG: 300 INJECTION INTRAVENOUS at 11:30

## 2018-07-05 ASSESSMENT — PAIN SCALES - GENERAL: PAINLEVEL_OUTOF10: 0

## 2018-07-05 NOTE — PROGRESS NOTES
Pt presented to infusion center for Tysabri. Pt reports she gets infusions every 5 weeks now. Denies any current s/s of infection or open wounds. TOUCH checklist completed. PIV started, brisk blood return observed. Tysabri infused with no s/s of adverse reaction. 1 hour obs completed with no s/s of adverse reaction. PIV flushed and removed, gauze and coban dressing placed. Pt has next appt. Left on foot in good spirits.

## 2018-08-08 ENCOUNTER — OUTPATIENT INFUSION SERVICES (OUTPATIENT)
Dept: ONCOLOGY | Facility: MEDICAL CENTER | Age: 58
End: 2018-08-08
Attending: PSYCHIATRY & NEUROLOGY
Payer: MEDICARE

## 2018-08-08 VITALS
RESPIRATION RATE: 18 BRPM | HEIGHT: 61 IN | OXYGEN SATURATION: 97 % | WEIGHT: 220.9 LBS | BODY MASS INDEX: 41.71 KG/M2 | DIASTOLIC BLOOD PRESSURE: 74 MMHG | HEART RATE: 75 BPM | TEMPERATURE: 98.9 F | SYSTOLIC BLOOD PRESSURE: 121 MMHG

## 2018-08-08 PROCEDURE — 700105 HCHG RX REV CODE 258: Performed by: PSYCHIATRY & NEUROLOGY

## 2018-08-08 PROCEDURE — 306780 HCHG STAT FOR TRANSFUSION PER CASE

## 2018-08-08 PROCEDURE — 700111 HCHG RX REV CODE 636 W/ 250 OVERRIDE (IP): Mod: JG | Performed by: PSYCHIATRY & NEUROLOGY

## 2018-08-08 PROCEDURE — 96413 CHEMO IV INFUSION 1 HR: CPT

## 2018-08-08 RX ADMIN — NATALIZUMAB 300 MG: 300 INJECTION INTRAVENOUS at 12:20

## 2018-08-08 ASSESSMENT — PAIN SCALES - GENERAL: PAINLEVEL_OUTOF10: 0

## 2018-08-08 NOTE — PROGRESS NOTES
Pt presented to infusion center for monthlyTysabri. Denies any current s/s of infection or open wounds. TOUCH checklist completed. PIV started, brisk blood return observed. Tysabri infused with no s/s of adverse reaction. 1 hour obs completed with no s/s of adverse reaction. PIV flushed and removed, gauze and coban dressing placed. Pt has next appt. Left on foot in good spirits.

## 2018-09-06 ENCOUNTER — OUTPATIENT INFUSION SERVICES (OUTPATIENT)
Dept: ONCOLOGY | Facility: MEDICAL CENTER | Age: 58
End: 2018-09-06
Attending: PSYCHIATRY & NEUROLOGY
Payer: MEDICARE

## 2018-09-06 VITALS
RESPIRATION RATE: 18 BRPM | HEART RATE: 80 BPM | TEMPERATURE: 97.3 F | HEIGHT: 61 IN | DIASTOLIC BLOOD PRESSURE: 74 MMHG | WEIGHT: 219.36 LBS | OXYGEN SATURATION: 98 % | SYSTOLIC BLOOD PRESSURE: 124 MMHG | BODY MASS INDEX: 41.42 KG/M2

## 2018-09-06 PROCEDURE — 96413 CHEMO IV INFUSION 1 HR: CPT

## 2018-09-06 PROCEDURE — 306780 HCHG STAT FOR TRANSFUSION PER CASE

## 2018-09-06 PROCEDURE — 700111 HCHG RX REV CODE 636 W/ 250 OVERRIDE (IP): Mod: JG | Performed by: PSYCHIATRY & NEUROLOGY

## 2018-09-06 PROCEDURE — 700105 HCHG RX REV CODE 258: Performed by: PSYCHIATRY & NEUROLOGY

## 2018-09-06 RX ADMIN — NATALIZUMAB 300 MG: 300 INJECTION INTRAVENOUS at 09:51

## 2018-09-06 ASSESSMENT — PAIN SCALES - GENERAL: PAINLEVEL_OUTOF10: 0

## 2018-09-06 NOTE — PROGRESS NOTES
Patient presents for monthly Tysabri infusion. Touch questions completed online. IV started, visualized brisk blood return. Tysabri infused over one hour. Patient observed for one hour after infusion and remains stable. IV removed, compression dressing to site. Patient scheduled for next appointment and released in no acute distress.

## 2018-09-19 ENCOUNTER — OFFICE VISIT (OUTPATIENT)
Dept: NEUROLOGY | Facility: MEDICAL CENTER | Age: 58
End: 2018-09-19
Payer: MEDICARE

## 2018-09-19 DIAGNOSIS — G35 MULTIPLE SCLEROSIS (HCC): ICD-10-CM

## 2018-09-19 DIAGNOSIS — E55.9 VITAMIN D DEFICIENCY: ICD-10-CM

## 2018-09-19 PROCEDURE — 99215 OFFICE O/P EST HI 40 MIN: CPT | Performed by: PSYCHIATRY & NEUROLOGY

## 2018-09-19 NOTE — PROGRESS NOTES
CC: Multiple Sclerosis       HPI:    Radha Smith is a 57 y.o. female who presents today in neurologic follow up for multiple sclerosis. She is accompanied by her  to today's visit. The patient is currently being treated with Tysabri and tolerating her infusions well. We switched her to q5 week dosing and she denies any wearing off effect preceding her infusions. She continues to experience word-finding difficulties. She denies any falls. She provided a CD of her MRI's from 2017 so we could compare them to her recent scans.     MS History:  Diagnosed: 1997   First presentation: right leg numbness.  Disease modifying treatments:               Previous: Avonex gave nausea switched to Copaxone, Switched to Rebif              Current: Tysabri started 10 years ago.   Former or other neurologist: Dr. Yañez at [facility]. Formerly Dr. Pinedo.  Last attack: 10 years ago.   Last MRI: June 5, 2018  FLACO Virus Antibody: negative on May 9, 2018- Index 0.07        ROS:   Constitutional: No fevers or chills.  Eyes: No blurry vision or eye pain.  ENT: No dysphagia or hearing loss.  Respiratory: No cough or shortness of breath.  Cardiovascular: No chest pain or palpitations.  GI: No nausea, vomiting, or diarrhea.  : No urinary incontinence or dysuria.  Musculoskeletal: No joint swelling or arthralgias.  Skin: No skin rashes.  Neuro: No headaches, dizziness, or tremors.  Endocrine: No heat or cold intolerance. No polydipsia or polyuria.  Psych: No depression or anxiety.  Heme/Lymph: No easy bruising or swollen lymph nodes.  All other review of systems were reviewed and were negative.     Past Medical History:   Past Medical History:   Diagnosis Date   • Asthma    • Depression    • Hypertension    • Multiple sclerosis (HCC)        Past Surgical History:   Past Surgical History:   Procedure Laterality Date   • ATHROPLASTY     • CHOLECYSTECTOMY     • KNEE ARTHROSCOPY     • PRIMARY C SECTION         Social History:   Social History  "    Social History   • Marital status:      Spouse name: N/A   • Number of children: N/A   • Years of education: N/A     Occupational History   • Not on file.     Social History Main Topics   • Smoking status: Light Tobacco Smoker     Types: Cigarettes   • Smokeless tobacco: Never Used      Comment: Previously smoked     • Alcohol use 3.0 oz/week     5 Glasses of wine per week   • Drug use: No   • Sexual activity: Not on file     Other Topics Concern   • Not on file     Social History Narrative   • No narrative on file       Family Hx:   Family History   Problem Relation Age of Onset   • Cancer Mother         Breast   • Diabetes Mother    • Other Father         Gout   • Hypertension Brother    • Bipolar disorder Son        Current Medications:   Current Outpatient Prescriptions:   •  LORazepam (ATIVAN) 1 MG Tab, Take 1 mg by mouth at bedtime as needed., Disp: , Rfl:   •  gabapentin (NEURONTIN) 300 MG Cap, Take 1 Cap by mouth 4 times a day., Disp: 120 Cap, Rfl: 2  •  Cholecalciferol 66800 units Tab, Take 10,000 Units by mouth., Disp: , Rfl:   •  drospirenone-estradiol (ANGELIQ) 0.5-1 MG per tablet, Take  by mouth., Disp: , Rfl:   •  lisinopril (PRINIVIL) 20 MG Tab, Take  by mouth., Disp: , Rfl:   •  Multiple Vitamin (MULTI-VITAMINS) Tab, Take  by mouth., Disp: , Rfl:   •  natalizumab (TYSABRI) 300 MG/15ML Conc, by Intravenous route., Disp: , Rfl:   •  sertraline (ZOLOFT) 100 MG Tab, Take  by mouth., Disp: , Rfl:   •  buPROPion (WELLBUTRIN XL) 150 MG XL tablet, Take  by mouth., Disp: , Rfl:     Allergies: No Known Allergies      Physical Exam:   Ambulatory Vitals  Encounter Vitals  Temperature: 36.6 °C (97.9 °F)  Blood Pressure: 124/84  Pulse: 89  Pulse Oximetry: 93 %  Weight: 98.9 kg (218 lb)  Height: 154.9 cm (5' 1\")  BMI (Calculated): 41.19    Constitutional: Well-developed, well-nourished, good hygiene. Appears stated age.  Cardiovascular: RRR, with no murmurs, rubs or gallops. No carotid bruits. No " peripheral edema, pedal pulses intact.   Respiratory: Lungs CTA B/L, no W/R/R.   Skin: Warm, dry, intact. No rashes observed.  Eyes: Sclera anicteric.  Neurologic:   Mental Status: Awake, alert, oriented x 3.   Speech: Fluent with normal prosody.   Memory: Able to recall recent and remote events accurately.    Concentration: Attentive. Able to focus on history and follow multi-step commands.   Fund of Knowledge: Appropriate.   Cranial Nerves:    CN II: PERRL. No APD.    CN III, IV, VI: Good eye closure, EOMI.     CN V: Facial sensation intact and symmetric.     CN VII: No facial asymmetry.     CN VIII: Hearing intact.     CN IX and X: Palate elevates symmetrically. Normal gag reflex.    CN XI: Symmetric shoulder shrug.     CN XII: Tongue midline.    Sensory: Intact light touch, vibration and temperature.    Coordination: Right upper and lower extremity decreased coorination.   DTR's: 2+ throughout without clonus.    Babinski: Toes downgoing bilaterally.   Romberg: Negative.   Movements: Left upper extremity ataxic tremor.  Musculoskeletal:    Strength: Right upper extremity finger flexors 4/5. 5/5 on the left.    Gait: Steady, narrow based. Difficulty with tandem walking.   Tone: Normal bulk and tone.   Joints: No swelling.    EDSS: 4.0       Assessment/Plan:  Multiple sclerosis (CMS-[facility])  58 yo F with pmhx of Relapsing Multiple Sclerosis without disease activity and without progression currently on Tysabri for over ten years, FLACO Virus antibody negative as of May 2018. Clinically, she remains without true relapse, although reports pseudo-relapses with infections. Today we compared her brain MRI from November 2017 to her most recent one from June 2018 and overall the scans appear the same. We discussed benefit of extended interval dosing in further reducing her PML risk and she is agreeable to go from [id number] to q6 weeks.     Plan:  1. Order placed for Tysabri infusions q6 weeks  2. Next FLACO Virus antibody test in  November 2018 - lab slip for [facility] given to the patient  3. Next MRI imaging June 2019  4. Continue Vitamin D supplement - will check Vitamin D level with her next labs.  5. Continue Neurontin 300mg four times a day for neuropathic pain.      Greater than 50% of this 40 minute face to face follow up encounter was devoted to disease state counseling on Multiple Sclerosis and coordination of care. Additional time was spent reviewing and comparing prior MRI imaging to most recent in the exam room together today (see above assessment and plan for further details of discussion).       Tamera Tenorio D.O., M.P.H  MS specialist.   Board Certified Neurologist.  Neurology Clerkship Director, [facility].    Neurology,  [facility].   Tel: [phone]  Fax: [phone]

## 2018-09-20 VITALS
OXYGEN SATURATION: 93 % | HEIGHT: 61 IN | HEART RATE: 89 BPM | DIASTOLIC BLOOD PRESSURE: 84 MMHG | TEMPERATURE: 97.9 F | WEIGHT: 218 LBS | BODY MASS INDEX: 41.16 KG/M2 | SYSTOLIC BLOOD PRESSURE: 124 MMHG

## 2018-09-20 NOTE — ASSESSMENT & PLAN NOTE
56 yo F with pmhx of Relapsing Multiple Sclerosis without disease activity and without progression currently on Tysabri for over ten years, FLACO Virus antibody negative as of May 2018. Clinically, she remains without true relapse, although reports pseudo-relapses with infections. Today we compared her brain MRI from November 2017 to her most recent one from June 2018 and overall the scans appear the same. We discussed benefit of extended interval dosing in further reducing her PML risk and she is agreeable to go from [id number] to q6 weeks.     Plan:  1. Order placed for Tysabri infusions q6 weeks  2. Next FLACO Virus antibody test in November 2018 - lab slip for [facility] given to the patient  3. Next MRI imaging June 2019  4. Continue Vitamin D supplement - will check Vitamin D level with her next labs.  5. Continue Neurontin 300mg four times a day for neuropathic pain.

## 2018-10-10 ENCOUNTER — OUTPATIENT INFUSION SERVICES (OUTPATIENT)
Dept: ONCOLOGY | Facility: MEDICAL CENTER | Age: 58
End: 2018-10-10
Attending: PSYCHIATRY & NEUROLOGY
Payer: MEDICARE

## 2018-10-10 VITALS
OXYGEN SATURATION: 99 % | DIASTOLIC BLOOD PRESSURE: 72 MMHG | RESPIRATION RATE: 18 BRPM | SYSTOLIC BLOOD PRESSURE: 128 MMHG | BODY MASS INDEX: 41.08 KG/M2 | WEIGHT: 217.59 LBS | TEMPERATURE: 97.4 F | HEIGHT: 61 IN | HEART RATE: 77 BPM

## 2018-10-10 PROCEDURE — 306780 HCHG STAT FOR TRANSFUSION PER CASE

## 2018-10-10 PROCEDURE — 96413 CHEMO IV INFUSION 1 HR: CPT

## 2018-10-10 PROCEDURE — 700111 HCHG RX REV CODE 636 W/ 250 OVERRIDE (IP): Mod: JG | Performed by: PSYCHIATRY & NEUROLOGY

## 2018-10-10 PROCEDURE — 700105 HCHG RX REV CODE 258: Performed by: PSYCHIATRY & NEUROLOGY

## 2018-10-10 RX ADMIN — NATALIZUMAB 300 MG: 300 INJECTION INTRAVENOUS at 11:15

## 2018-10-10 ASSESSMENT — PAIN SCALES - GENERAL: PAINLEVEL_OUTOF10: 0

## 2018-10-10 NOTE — PROGRESS NOTES
Pt arrived ambulatory to IS for Tysabri.  POC discussed, pt verbalized understanding and denies active infections.  TOUCH program online questionnaire completed and pt is appropriate for treatment today.  Pt recently has appt with MD and will proceed from here on out with q 6 week infusion.  Received ok from Dr. Willis via telephone prder to proceed today at week 5.  PIV started to LAC, blood return confirmed from line.  Tysabri infused as ordered without complication.  1 hour observation period completed without adverse reaction.  PIV flushed, removed, and site covered with gauze and coban.  Next appointment confirmed.  Pt discharged from IS in NAD under self care.

## 2018-10-11 DIAGNOSIS — G62.9 NEUROPATHY: ICD-10-CM

## 2018-10-15 RX ORDER — GABAPENTIN 300 MG/1
CAPSULE ORAL
Qty: 240 CAP | Refills: 1 | Status: SHIPPED | OUTPATIENT
Start: 2018-10-15 | End: 2024-01-17

## 2018-10-15 NOTE — TELEPHONE ENCOUNTER
This is a Dr Tenorio pt. Would you please refill.    Was the patient seen in the last year in this department? Yes    Does patient have an active prescription for medications requested? Yes    Received Request Via: Pharmacy    Thank you

## 2018-11-23 ENCOUNTER — OUTPATIENT INFUSION SERVICES (OUTPATIENT)
Dept: ONCOLOGY | Facility: MEDICAL CENTER | Age: 58
End: 2018-11-23
Attending: PSYCHIATRY & NEUROLOGY
Payer: MEDICARE

## 2018-11-23 VITALS
SYSTOLIC BLOOD PRESSURE: 116 MMHG | HEART RATE: 86 BPM | WEIGHT: 213.85 LBS | BODY MASS INDEX: 40.37 KG/M2 | OXYGEN SATURATION: 97 % | TEMPERATURE: 98.5 F | DIASTOLIC BLOOD PRESSURE: 72 MMHG | HEIGHT: 61 IN | RESPIRATION RATE: 18 BRPM

## 2018-11-23 PROCEDURE — 700111 HCHG RX REV CODE 636 W/ 250 OVERRIDE (IP): Mod: JG | Performed by: PSYCHIATRY & NEUROLOGY

## 2018-11-23 PROCEDURE — 96413 CHEMO IV INFUSION 1 HR: CPT

## 2018-11-23 PROCEDURE — 306780 HCHG STAT FOR TRANSFUSION PER CASE

## 2018-11-23 PROCEDURE — 700105 HCHG RX REV CODE 258: Performed by: PSYCHIATRY & NEUROLOGY

## 2018-11-23 RX ADMIN — NATALIZUMAB 300 MG: 300 INJECTION INTRAVENOUS at 15:18

## 2018-11-23 ASSESSMENT — PAIN SCALES - GENERAL: PAINLEVEL_OUTOF10: 0

## 2018-11-24 NOTE — PROGRESS NOTES
Pt arrived to IS, ambulatory, for Tysabri infusion. Pt voices no complaints. Touch questionnaire completed, pt within parameters to treat today. 24g PIV established in the L-AC, positive blood return noted. Tysabri infused with no s/sx of adverse reaction. 1 hour post infusion monitoring completed with no complications. PIV flushed and removed. Pt left IS with no s/sx of distress. Follow up appointment confirmed.

## 2019-01-04 ENCOUNTER — OUTPATIENT INFUSION SERVICES (OUTPATIENT)
Dept: ONCOLOGY | Facility: MEDICAL CENTER | Age: 59
End: 2019-01-04
Attending: PSYCHIATRY & NEUROLOGY
Payer: MEDICARE

## 2019-01-04 VITALS
HEART RATE: 88 BPM | RESPIRATION RATE: 18 BRPM | SYSTOLIC BLOOD PRESSURE: 123 MMHG | TEMPERATURE: 99 F | HEIGHT: 61 IN | BODY MASS INDEX: 40.29 KG/M2 | WEIGHT: 213.41 LBS | DIASTOLIC BLOOD PRESSURE: 76 MMHG

## 2019-01-04 PROCEDURE — 700111 HCHG RX REV CODE 636 W/ 250 OVERRIDE (IP): Mod: JG | Performed by: PSYCHIATRY & NEUROLOGY

## 2019-01-04 PROCEDURE — 96413 CHEMO IV INFUSION 1 HR: CPT

## 2019-01-04 PROCEDURE — 306780 HCHG STAT FOR TRANSFUSION PER CASE

## 2019-01-04 PROCEDURE — 700105 HCHG RX REV CODE 258: Performed by: PSYCHIATRY & NEUROLOGY

## 2019-01-04 RX ADMIN — NATALIZUMAB 300 MG: 300 INJECTION INTRAVENOUS at 11:25

## 2019-01-04 ASSESSMENT — PAIN SCALES - GENERAL: PAINLEVEL_OUTOF10: 0

## 2019-01-05 NOTE — PROGRESS NOTES
Pt presented to infusion center for Q 6 week Tysabri. Denies any current s/s of infection or open wounds. TOUCH checklist completed. PIV started, brisk blood return observed. Tysabri infused with no s/s of adverse reaction. 1 hour obs completed with no s/s of adverse reaction. PIV flushed and removed, gauze and coban dressing placed. Pt has next appt. Left on foot in good spirits.

## 2019-02-15 ENCOUNTER — OUTPATIENT INFUSION SERVICES (OUTPATIENT)
Dept: ONCOLOGY | Facility: MEDICAL CENTER | Age: 59
End: 2019-02-15
Attending: PSYCHIATRY & NEUROLOGY
Payer: MEDICARE

## 2019-02-15 VITALS
TEMPERATURE: 98.2 F | SYSTOLIC BLOOD PRESSURE: 121 MMHG | WEIGHT: 217.81 LBS | BODY MASS INDEX: 42.76 KG/M2 | HEIGHT: 60 IN | DIASTOLIC BLOOD PRESSURE: 69 MMHG | HEART RATE: 95 BPM | OXYGEN SATURATION: 99 % | RESPIRATION RATE: 18 BRPM

## 2019-02-15 DIAGNOSIS — G35 MULTIPLE SCLEROSIS (HCC): ICD-10-CM

## 2019-02-15 PROCEDURE — 306780 HCHG STAT FOR TRANSFUSION PER CASE

## 2019-02-15 PROCEDURE — 96413 CHEMO IV INFUSION 1 HR: CPT

## 2019-02-15 PROCEDURE — 700111 HCHG RX REV CODE 636 W/ 250 OVERRIDE (IP): Mod: JG | Performed by: PSYCHIATRY & NEUROLOGY

## 2019-02-15 PROCEDURE — 700105 HCHG RX REV CODE 258: Performed by: PSYCHIATRY & NEUROLOGY

## 2019-02-15 RX ADMIN — NATALIZUMAB 300 MG: 300 INJECTION INTRAVENOUS at 12:02

## 2019-02-15 ASSESSMENT — PAIN SCALES - GENERAL: PAINLEVEL: NO PAIN

## 2019-02-15 NOTE — PROGRESS NOTES
Radha here for tysabri infusion. Radha reports no new or changed symptoms. TOUCH pre-infusion patient checklist completed online. Tysabri infused as ordered. Radha tolerated well. Jorgito observed by RN for one hour after infusion finished. No signed or symptoms of adverse reaction observed or expressed. Next appointment scheduled. Discharged to self care; no apparent distress noted.

## 2019-03-29 ENCOUNTER — OUTPATIENT INFUSION SERVICES (OUTPATIENT)
Dept: ONCOLOGY | Facility: MEDICAL CENTER | Age: 59
End: 2019-03-29
Attending: PSYCHIATRY & NEUROLOGY
Payer: MEDICARE

## 2019-03-29 VITALS
SYSTOLIC BLOOD PRESSURE: 138 MMHG | OXYGEN SATURATION: 99 % | WEIGHT: 221.12 LBS | RESPIRATION RATE: 18 BRPM | DIASTOLIC BLOOD PRESSURE: 81 MMHG | HEART RATE: 76 BPM | BODY MASS INDEX: 41.75 KG/M2 | TEMPERATURE: 97.9 F | HEIGHT: 61 IN

## 2019-03-29 PROCEDURE — 306780 HCHG STAT FOR TRANSFUSION PER CASE

## 2019-03-29 PROCEDURE — 700105 HCHG RX REV CODE 258: Performed by: PSYCHIATRY & NEUROLOGY

## 2019-03-29 PROCEDURE — 96413 CHEMO IV INFUSION 1 HR: CPT

## 2019-03-29 PROCEDURE — 700111 HCHG RX REV CODE 636 W/ 250 OVERRIDE (IP): Mod: JG | Performed by: PSYCHIATRY & NEUROLOGY

## 2019-03-29 RX ADMIN — NATALIZUMAB 300 MG: 300 INJECTION INTRAVENOUS at 11:48

## 2019-03-29 NOTE — PROGRESS NOTES
Pt returns for q6 week Tysabri for MS. Online TOUCH program questions completed and pt appropriate for infusion today. IV access established. Tysabri infused over one hour with one hour of observation completed without event. IV flushed and removed. Pressure dressing applied. Pt knows when to return, discharged home under self care in no apparent distress.

## 2019-05-10 ENCOUNTER — OUTPATIENT INFUSION SERVICES (OUTPATIENT)
Dept: ONCOLOGY | Facility: MEDICAL CENTER | Age: 59
End: 2019-05-10
Attending: PSYCHIATRY & NEUROLOGY
Payer: MEDICARE

## 2019-05-10 VITALS
BODY MASS INDEX: 41.91 KG/M2 | RESPIRATION RATE: 18 BRPM | WEIGHT: 222 LBS | HEIGHT: 61 IN | TEMPERATURE: 98 F | DIASTOLIC BLOOD PRESSURE: 90 MMHG | SYSTOLIC BLOOD PRESSURE: 141 MMHG | OXYGEN SATURATION: 99 % | HEART RATE: 80 BPM

## 2019-05-10 PROCEDURE — 700105 HCHG RX REV CODE 258: Performed by: PSYCHIATRY & NEUROLOGY

## 2019-05-10 PROCEDURE — 306780 HCHG STAT FOR TRANSFUSION PER CASE

## 2019-05-10 PROCEDURE — 96365 THER/PROPH/DIAG IV INF INIT: CPT

## 2019-05-10 PROCEDURE — 700111 HCHG RX REV CODE 636 W/ 250 OVERRIDE (IP): Mod: JG | Performed by: PSYCHIATRY & NEUROLOGY

## 2019-05-10 RX ADMIN — NATALIZUMAB 300 MG: 300 INJECTION INTRAVENOUS at 12:26

## 2019-05-10 NOTE — PROGRESS NOTES
Pt to [facility] for Q6 week Tysabri infusion.  Pt denies any current infections.  Tysabri Touch completed.  PIV placed, flushed easily, omero briskly.  Pt received Tysabri over 1 hour, tolerated well.  Pt remained for 1 hour observation after infusion completed.  No s/sx of reaction.  PIV d/c'd, catheter tip remained intact. Gauze and coban to site. Pt left [facility] in NAD.

## 2019-06-21 ENCOUNTER — OUTPATIENT INFUSION SERVICES (OUTPATIENT)
Dept: ONCOLOGY | Facility: MEDICAL CENTER | Age: 59
End: 2019-06-21
Attending: PSYCHIATRY & NEUROLOGY
Payer: MEDICARE

## 2019-06-21 VITALS
WEIGHT: 224.87 LBS | DIASTOLIC BLOOD PRESSURE: 69 MMHG | OXYGEN SATURATION: 92 % | HEIGHT: 60 IN | RESPIRATION RATE: 18 BRPM | SYSTOLIC BLOOD PRESSURE: 125 MMHG | HEART RATE: 90 BPM | TEMPERATURE: 97.8 F | BODY MASS INDEX: 44.15 KG/M2

## 2019-06-21 PROCEDURE — 700105 HCHG RX REV CODE 258: Performed by: PSYCHIATRY & NEUROLOGY

## 2019-06-21 PROCEDURE — 700111 HCHG RX REV CODE 636 W/ 250 OVERRIDE (IP): Mod: JG | Performed by: PSYCHIATRY & NEUROLOGY

## 2019-06-21 PROCEDURE — 96365 THER/PROPH/DIAG IV INF INIT: CPT

## 2019-06-21 PROCEDURE — 306780 HCHG STAT FOR TRANSFUSION PER CASE

## 2019-06-21 RX ADMIN — NATALIZUMAB 300 MG: 300 INJECTION INTRAVENOUS at 14:52

## 2019-06-22 NOTE — PROGRESS NOTES
Patient arrived for q6 week Tysabri; reports no changes or concerns. TOUCH checklist completed, within parameters. Treatment completed without issue, one hour monitoring completed with no s/s intolerance. Pt discharged home in no apparent distress. Pt to schedule next appt.

## 2019-07-08 ENCOUNTER — OFFICE VISIT (OUTPATIENT)
Dept: NEUROLOGY | Facility: MEDICAL CENTER | Age: 59
End: 2019-07-08
Payer: MEDICARE

## 2019-07-08 VITALS
WEIGHT: 221 LBS | DIASTOLIC BLOOD PRESSURE: 80 MMHG | TEMPERATURE: 98.6 F | OXYGEN SATURATION: 95 % | SYSTOLIC BLOOD PRESSURE: 116 MMHG | BODY MASS INDEX: 41.72 KG/M2 | HEIGHT: 61 IN | HEART RATE: 69 BPM

## 2019-07-08 DIAGNOSIS — G35 MULTIPLE SCLEROSIS (HCC): ICD-10-CM

## 2019-07-08 PROCEDURE — 99215 OFFICE O/P EST HI 40 MIN: CPT | Performed by: PSYCHIATRY & NEUROLOGY

## 2019-07-08 RX ORDER — AMANTADINE HYDROCHLORIDE 100 MG/1
100 CAPSULE, GELATIN COATED ORAL 2 TIMES DAILY
Qty: 60 CAP | Refills: 2 | Status: SHIPPED | OUTPATIENT
Start: 2019-07-08 | End: 2019-09-13

## 2019-07-08 ASSESSMENT — ENCOUNTER SYMPTOMS
TINGLING: 1
VOMITING: 0
EYE PAIN: 1
BACK PAIN: 1
NAUSEA: 1
DOUBLE VISION: 0
BLURRED VISION: 1
PALPITATIONS: 0
SHORTNESS OF BREATH: 0

## 2019-07-08 NOTE — ASSESSMENT & PLAN NOTE
Ms. Radha Smith is a 59 yo F with pmhx of Relapsing Multiple Sclerosis who is been on disease modifying treatment with Tysabri for over ten years, FLACO Virus antibody negative as of May 2018.  Today I answered the patient and her 's questions regarding other treatment options including venous dilation, which has been debunked, and the state of current stem cell treatments.  I recommended they do not waste their money on stem cell treatments and unregulated clinics.    Plan:  1. Continue Tysabri infusions q6 weeks  2.  Once her FLACO virus antibody test results, if positive I will contact her for sooner follow-up visit.  3.  Repeat brain MRI for Tysabri surveillance.   4. Continue Vitamin D supplement - will check Vitamin D level with her next labs.  5. Continue Neurontin 300mg four times a day for neuropathic pain.

## 2019-07-08 NOTE — PROGRESS NOTES
CC: Multiple Sclerosis       HPI:    Radha Smith is a pleasant 57 y.o. female who presents today in neurologic follow up for multiple sclerosis. She is again accompanied by her  to today's visit. The patient is currently being treated with Tysabri q6 weeks and denies any side effects from the medication. She was last seen on 4/30/18. Her last FLACO Virus antibody test was done last week, but the results are not yet available for review.    She describes new numbness on the bottoms of her feet which began two weeks ago. This has not impaired her walking. She feels like she has increased brain fog and more word finding difficulties. She has been under more stress recently. Her  has not noticed any major changes, but does endorse daily fluctuations. Her fatigue has been more bothersome to her especially in the summer heat..      MS History:  Diagnosed: 1997   First presentation: right leg numbness.  Disease modifying treatments:    Previous: Avonex gave nausea switched to Copaxone, Switched to Rebif   Current: Tysabri started 10 years ago.   Former or other neurologist: Dr. Yañez at [facility]. Formerly Dr. Pinedo.  Last attack: 10 years ago.   FLACO Virus Antibody: negative on May 9, 2018- Index 0.07      Review of Systems   Constitutional: Positive for malaise/fatigue.   Eyes: Positive for blurred vision and pain. Negative for double vision.        About 3x a week had eye pain. This has resolved.    Respiratory: Negative for shortness of breath.    Cardiovascular: Negative for chest pain and palpitations.   Gastrointestinal: Positive for nausea. Negative for vomiting.   Genitourinary: Positive for frequency and urgency. Negative for dysuria.   Musculoskeletal: Positive for back pain.   Neurological: Positive for tingling.   All other review systems were negative.    Past Medical History:   Past Medical History:   Diagnosis Date   • Asthma    • Depression    • Hypertension    • Multiple sclerosis (HCC)         Past Surgical History:   Past Surgical History:   Procedure Laterality Date   • ATHROPLASTY     • CHOLECYSTECTOMY     • KNEE ARTHROSCOPY     • PRIMARY C SECTION         Social History:   Social History     Social History   • Marital status:      Spouse name: N/A   • Number of children: N/A   • Years of education: N/A     Occupational History   • Not on file.     Social History Main Topics   • Smoking status: Light Tobacco Smoker     Types: Cigarettes   • Smokeless tobacco: Never Used      Comment: Previously smoked     • Alcohol use 3.0 oz/week     5 Glasses of wine per week   • Drug use: No   • Sexual activity: Not on file     Other Topics Concern   • Not on file     Social History Narrative   • No narrative on file       Family Hx:   Family History   Problem Relation Age of Onset   • Cancer Mother         Breast   • Diabetes Mother    • Other Father         Gout   • Hypertension Brother    • Bipolar disorder Son        Current Medications:   Current Outpatient Prescriptions:   •  amantadine (SYMMETREL) 100 MG Cap, Take 1 Cap by mouth 2 times a day., Disp: 60 Cap, Rfl: 2  •  gabapentin (NEURONTIN) 300 MG Cap, TAKE ONE CAPSULE BY MOUTH FOUR TIMES A DAY, Disp: 240 Cap, Rfl: 1  •  LORazepam (ATIVAN) 1 MG Tab, Take 1 mg by mouth at bedtime as needed., Disp: , Rfl:   •  Cholecalciferol 90303 units Tab, Take 10,000 Units by mouth., Disp: , Rfl:   •  drospirenone-estradiol (ANGELIQ) 0.5-1 MG per tablet, Take  by mouth., Disp: , Rfl:   •  lisinopril (PRINIVIL) 20 MG Tab, Take  by mouth., Disp: , Rfl:   •  Multiple Vitamin (MULTI-VITAMINS) Tab, Take  by mouth., Disp: , Rfl:   •  natalizumab (TYSABRI) 300 MG/15ML Conc, by Intravenous route., Disp: , Rfl:   •  sertraline (ZOLOFT) 100 MG Tab, Take  by mouth., Disp: , Rfl:   •  buPROPion (WELLBUTRIN XL) 150 MG XL tablet, Take  by mouth., Disp: , Rfl:     Allergies: No Known Allergies      Physical Exam:   Vitals:    07/08/19 1131   BP: 116/80   Pulse: 69   Temp: 37  °C (98.6 °F)   SpO2: 95%     Constitutional: Well-developed, well-nourished, good hygiene. Appears stated age.  Respiratory: Normal respiratory effort.  Skin: Warm, dry, intact. No rashes observed.  Eyes: Sclera anicteric.  Neurologic:   Mental Status: Awake, alert, oriented x 3.   Speech: Slight scanning quality to her speech, unchanged.   Memory: Able to recall recent and remote events accurately.    Concentration: Attentive. Able to focus on history and follow multi-step commands.   Fund of Knowledge: Appropriate.   Cranial Nerves:    CN II: PERRL. No afferent pupillary defect.    CN III, IV, VI: Good eye closure, EOMI.     CN V: Facial sensation intact and symmetric.     CN VII: No facial asymmetry.     CN VIII: Hearing intact.     CN IX and X: Palate elevates symmetrically. Normal gag reflex.    CN XI: Symmetric shoulder shrug.     CN XII: Tongue midline.    Sensory: Intact light touch, vibration and temperature.    Coordination: Left upper extremity ataxia with finger-to-nose testing.   DTR's: Hyperpathic.    Babinski: Toes downgoing bilaterally.   Romberg: Negative.   Movements: No tremors observed.   Musculoskeletal:    Strength: 4/5 in the right finger flexors. 5/5 in left upper and lower extremities bilaterally.  Deltoids      R 5/5 L 5/5  Biceps        R 5/5 L 5/5  Triceps       R 5/5 L 5/5  Wrist Ext    R 5/5 L 5/5  Finger Flex R 5/5 L 5/5  Finger Ext   R 5/5 L 5/5  Hip Flex      R 5/5 L 5/5  Knee Flex   R 5/5 L 5/5  Knee Ext    R 5/5 L 5/5  Ankle DF    R 5/5 L 5/5  Ankle PF    R 5/5 L 5/5   Gait: Steady, narrow based. Able to perform tandem walking. No assistive devices.   Tone: Normal bulk and tone.   Joints: No swelling.     Imaging:   Today I reviewed the patient's most recent MRI images with her in the examination room. I explained basic terminology of MRI's, verbalized my assessment, and answered her questions.     MRI Brain w/wo contrast from 6/5/18  1.  Mild cerebral atrophy manifest as  "prominence of sulcal markings over the convexities and vertex most consistent with volume loss associated with long-standing sclerosis.  2.  Advanced supratentorial white matter disease with lesion morphology consistent with multiple sclerosis. No \"active\" enhancing lesions.  3.  Several foci of demyelinating disease in the posterior fossa and possible demyelinating lesion in the right dorsal lateral medulla.    MRI c-spine w/wo contrast from 6/5/18  1.  There are no abnormal intramedullary T2 signal intensity in the cervical spinal cord.  2.  Mild cervical degenerative disease as described above.    Assessment/Plan:  Multiple sclerosis (CMS-HCC)  MsPhyllis Smith is a 57 yo F with pmhx of Relapsing Multiple Sclerosis who is been on disease modifying treatment with Tysabri for over ten years, FLACO Virus antibody negative as of May 2018.  Today I answered the patient and her 's questions regarding other treatment options including venous dilation, which has been debunked, and the state of current stem cell treatments.  I recommended they do not waste their money on stem cell treatments and unregulated clinics.    Plan:  1. Continue Tysabri infusions q6 weeks  2.  Once her FLACO virus antibody test results, if positive I will contact her for sooner follow-up visit.  3.  Repeat brain MRI for Tysabri surveillance.   4. Continue Vitamin D supplement - will check Vitamin D level with her next labs.  5. Continue Neurontin 300mg four times a day for neuropathic pain.    Greater than 50% of this 40 minute face to face follow up encounter was devoted to disease state counseling on Multiple Sclerosis and coordination of care. During today's encounter we reviewed her recent labs as well as her recent MRI imaging together in the exam room. (see above assessment and plan for further details of discussion).       Tamera Tenorio D.O., M.P.H  MS specialist.   Board Certified Neurologist.  Neurology Clerkship Director, [facility].    Neurology,  [facility].   Tel: [phone]  Fax: [phone]

## 2019-07-30 ENCOUNTER — TELEPHONE (OUTPATIENT)
Dept: NEUROLOGY | Facility: MEDICAL CENTER | Age: 59
End: 2019-07-30

## 2019-07-30 NOTE — TELEPHONE ENCOUNTER
Pt called states she got steroid shot today for her shoulder. Asking if she can get her infusion Tysabri on Friday. Per Stella Puri is ok to infuse Friday. Let pt know, agreed

## 2019-08-02 ENCOUNTER — OUTPATIENT INFUSION SERVICES (OUTPATIENT)
Dept: ONCOLOGY | Facility: MEDICAL CENTER | Age: 59
End: 2019-08-02
Attending: PSYCHIATRY & NEUROLOGY
Payer: MEDICARE

## 2019-08-02 VITALS
OXYGEN SATURATION: 97 % | BODY MASS INDEX: 42.25 KG/M2 | SYSTOLIC BLOOD PRESSURE: 143 MMHG | TEMPERATURE: 98.1 F | DIASTOLIC BLOOD PRESSURE: 78 MMHG | HEIGHT: 61 IN | HEART RATE: 78 BPM | WEIGHT: 223.77 LBS | RESPIRATION RATE: 18 BRPM

## 2019-08-02 DIAGNOSIS — G35 MULTIPLE SCLEROSIS (HCC): ICD-10-CM

## 2019-08-02 PROCEDURE — 700111 HCHG RX REV CODE 636 W/ 250 OVERRIDE (IP): Mod: JG | Performed by: PSYCHIATRY & NEUROLOGY

## 2019-08-02 PROCEDURE — 700105 HCHG RX REV CODE 258: Performed by: PSYCHIATRY & NEUROLOGY

## 2019-08-02 PROCEDURE — 306780 HCHG STAT FOR TRANSFUSION PER CASE: Performed by: CLINICAL NURSE SPECIALIST

## 2019-08-02 PROCEDURE — 96365 THER/PROPH/DIAG IV INF INIT: CPT | Performed by: CLINICAL NURSE SPECIALIST

## 2019-08-02 RX ADMIN — NATALIZUMAB 300 MG: 300 INJECTION INTRAVENOUS at 12:22

## 2019-08-02 NOTE — PROGRESS NOTES
Patient to infusion for Tysabri.  No new concerns/complaints.  Tysabri online checklist completed.  PIV started to right AC with brisk blood return.  Tysabri infused as ordered followed by one hour observation without issue.  PIV removed and patient discharged to home in stable condition.  Will return in 6 weeks for next Tysabri appointment.

## 2019-09-12 ENCOUNTER — HOSPITAL ENCOUNTER (OUTPATIENT)
Dept: RADIOLOGY | Facility: MEDICAL CENTER | Age: 59
End: 2019-09-12
Attending: PSYCHIATRY & NEUROLOGY
Payer: MEDICARE

## 2019-09-12 DIAGNOSIS — G35 MULTIPLE SCLEROSIS (HCC): ICD-10-CM

## 2019-09-12 PROCEDURE — 70553 MRI BRAIN STEM W/O & W/DYE: CPT

## 2019-09-12 PROCEDURE — 700117 HCHG RX CONTRAST REV CODE 255: Performed by: PSYCHIATRY & NEUROLOGY

## 2019-09-12 RX ADMIN — GADOTERIDOL 20 ML: 279.3 INJECTION, SOLUTION INTRAVENOUS at 14:20

## 2019-09-13 ENCOUNTER — OUTPATIENT INFUSION SERVICES (OUTPATIENT)
Dept: ONCOLOGY | Facility: MEDICAL CENTER | Age: 59
End: 2019-09-13
Attending: PSYCHIATRY & NEUROLOGY
Payer: MEDICARE

## 2019-09-13 VITALS
TEMPERATURE: 98.1 F | DIASTOLIC BLOOD PRESSURE: 70 MMHG | OXYGEN SATURATION: 99 % | SYSTOLIC BLOOD PRESSURE: 137 MMHG | RESPIRATION RATE: 18 BRPM | BODY MASS INDEX: 42.58 KG/M2 | HEART RATE: 80 BPM | HEIGHT: 61 IN | WEIGHT: 225.53 LBS

## 2019-09-13 PROCEDURE — 700105 HCHG RX REV CODE 258: Performed by: PSYCHIATRY & NEUROLOGY

## 2019-09-13 PROCEDURE — 700111 HCHG RX REV CODE 636 W/ 250 OVERRIDE (IP): Mod: JG | Performed by: PSYCHIATRY & NEUROLOGY

## 2019-09-13 PROCEDURE — 96365 THER/PROPH/DIAG IV INF INIT: CPT

## 2019-09-13 PROCEDURE — 306780 HCHG STAT FOR TRANSFUSION PER CASE

## 2019-09-13 RX ADMIN — NATALIZUMAB 300 MG: 300 INJECTION INTRAVENOUS at 12:00

## 2019-09-13 NOTE — PROGRESS NOTES
Pt here for Tysabri infusion. In good spirits, voices no new complaints. Teaching and education reinforcement provided, also emotional support. TOUCH pre-infusion patient checklist completed online. Tysabri infused as ordered, without complications. Pt tolerated well. Pt then observed by RN for one hour after infusion finished. No signs or symptoms of adverse reaction observed or expressed. Discharge home to self care in [facility]. Will return in 6 weeks. Appointment confirm for next treatment.

## 2019-10-28 ENCOUNTER — OUTPATIENT INFUSION SERVICES (OUTPATIENT)
Dept: ONCOLOGY | Facility: MEDICAL CENTER | Age: 59
End: 2019-10-28
Attending: PSYCHIATRY & NEUROLOGY
Payer: MEDICARE

## 2019-10-28 NOTE — PROGRESS NOTES
Pt no showed for Tysabri infusion appt today.  Informed scheduling dept. to call pt to re-schedule.

## 2019-11-04 ENCOUNTER — OUTPATIENT INFUSION SERVICES (OUTPATIENT)
Dept: ONCOLOGY | Facility: MEDICAL CENTER | Age: 59
End: 2019-11-04
Attending: PSYCHIATRY & NEUROLOGY
Payer: MEDICARE

## 2019-11-04 VITALS
HEART RATE: 89 BPM | WEIGHT: 227.74 LBS | HEIGHT: 60 IN | BODY MASS INDEX: 44.71 KG/M2 | SYSTOLIC BLOOD PRESSURE: 128 MMHG | RESPIRATION RATE: 18 BRPM | DIASTOLIC BLOOD PRESSURE: 73 MMHG | TEMPERATURE: 98 F | OXYGEN SATURATION: 95 %

## 2019-11-04 PROCEDURE — 700111 HCHG RX REV CODE 636 W/ 250 OVERRIDE (IP): Mod: JG | Performed by: SPECIALIST

## 2019-11-04 PROCEDURE — 306780 HCHG STAT FOR TRANSFUSION PER CASE

## 2019-11-04 PROCEDURE — 700105 HCHG RX REV CODE 258: Performed by: SPECIALIST

## 2019-11-04 PROCEDURE — 96365 THER/PROPH/DIAG IV INF INIT: CPT

## 2019-11-04 RX ADMIN — NATALIZUMAB 300 MG: 300 INJECTION INTRAVENOUS at 13:45

## 2019-11-04 NOTE — PROGRESS NOTES
Patient presents for Tysabri infusion. Touch questions completed online. PIV established, flushes well with brisk blood return. Tysabri infused over one hour, line flushed clear. Patient observed for one hour and remains stable. PIV removed, tip intact, compression dressing to site. Patient scheduled for her next appointment and released in no acute distress.

## 2019-12-03 ENCOUNTER — HOSPITAL ENCOUNTER (OUTPATIENT)
Dept: LAB | Facility: MEDICAL CENTER | Age: 59
End: 2019-12-03
Attending: SPECIALIST
Payer: MEDICARE

## 2019-12-03 LAB
ALBUMIN SERPL BCP-MCNC: 4.6 G/DL (ref 3.2–4.9)
ALBUMIN/GLOB SERPL: 1.8 G/DL
ALP SERPL-CCNC: 54 U/L (ref 30–99)
ALT SERPL-CCNC: 13 U/L (ref 2–50)
ANION GAP SERPL CALC-SCNC: 5 MMOL/L (ref 0–11.9)
AST SERPL-CCNC: 12 U/L (ref 12–45)
BASOPHILS # BLD AUTO: 1.1 % (ref 0–1.8)
BASOPHILS # BLD: 0.09 K/UL (ref 0–0.12)
BILIRUB SERPL-MCNC: 0.4 MG/DL (ref 0.1–1.5)
BUN SERPL-MCNC: 18 MG/DL (ref 8–22)
CALCIUM SERPL-MCNC: 9.6 MG/DL (ref 8.5–10.5)
CHLORIDE SERPL-SCNC: 105 MMOL/L (ref 96–112)
CO2 SERPL-SCNC: 31 MMOL/L (ref 20–33)
CREAT SERPL-MCNC: 1.12 MG/DL (ref 0.5–1.4)
EOSINOPHIL # BLD AUTO: 0.21 K/UL (ref 0–0.51)
EOSINOPHIL NFR BLD: 2.5 % (ref 0–6.9)
ERYTHROCYTE [DISTWIDTH] IN BLOOD BY AUTOMATED COUNT: 45.9 FL (ref 35.9–50)
GLOBULIN SER CALC-MCNC: 2.6 G/DL (ref 1.9–3.5)
GLUCOSE SERPL-MCNC: 89 MG/DL (ref 65–99)
HCT VFR BLD AUTO: 43 % (ref 37–47)
HGB BLD-MCNC: 14.1 G/DL (ref 12–16)
IMM GRANULOCYTES # BLD AUTO: 0.07 K/UL (ref 0–0.11)
IMM GRANULOCYTES NFR BLD AUTO: 0.8 % (ref 0–0.9)
LYMPHOCYTES # BLD AUTO: 2.72 K/UL (ref 1–4.8)
LYMPHOCYTES NFR BLD: 32.5 % (ref 22–41)
MCH RBC QN AUTO: 30.8 PG (ref 27–33)
MCHC RBC AUTO-ENTMCNC: 32.8 G/DL (ref 33.6–35)
MCV RBC AUTO: 93.9 FL (ref 81.4–97.8)
MONOCYTES # BLD AUTO: 0.6 K/UL (ref 0–0.85)
MONOCYTES NFR BLD AUTO: 7.2 % (ref 0–13.4)
NEUTROPHILS # BLD AUTO: 4.68 K/UL (ref 2–7.15)
NEUTROPHILS NFR BLD: 55.9 % (ref 44–72)
NRBC # BLD AUTO: 0.04 K/UL
NRBC BLD-RTO: 0.5 /100 WBC
PLATELET # BLD AUTO: 203 K/UL (ref 164–446)
PMV BLD AUTO: 11 FL (ref 9–12.9)
POTASSIUM SERPL-SCNC: 4 MMOL/L (ref 3.6–5.5)
PROT SERPL-MCNC: 7.2 G/DL (ref 6–8.2)
RBC # BLD AUTO: 4.58 M/UL (ref 4.2–5.4)
SODIUM SERPL-SCNC: 141 MMOL/L (ref 135–145)
WBC # BLD AUTO: 8.4 K/UL (ref 4.8–10.8)

## 2019-12-03 PROCEDURE — 85025 COMPLETE CBC W/AUTO DIFF WBC: CPT

## 2019-12-03 PROCEDURE — 80053 COMPREHEN METABOLIC PANEL: CPT

## 2019-12-03 PROCEDURE — 36415 COLL VENOUS BLD VENIPUNCTURE: CPT

## 2019-12-16 ENCOUNTER — OUTPATIENT INFUSION SERVICES (OUTPATIENT)
Dept: ONCOLOGY | Facility: MEDICAL CENTER | Age: 59
End: 2019-12-16
Attending: PSYCHIATRY & NEUROLOGY
Payer: MEDICARE

## 2019-12-16 VITALS
TEMPERATURE: 97 F | SYSTOLIC BLOOD PRESSURE: 132 MMHG | BODY MASS INDEX: 42.75 KG/M2 | OXYGEN SATURATION: 96 % | WEIGHT: 226.41 LBS | HEIGHT: 61 IN | DIASTOLIC BLOOD PRESSURE: 80 MMHG | RESPIRATION RATE: 18 BRPM | HEART RATE: 81 BPM

## 2019-12-16 DIAGNOSIS — G35 MULTIPLE SCLEROSIS (HCC): ICD-10-CM

## 2019-12-16 PROCEDURE — 700105 HCHG RX REV CODE 258: Performed by: SPECIALIST

## 2019-12-16 PROCEDURE — 306780 HCHG STAT FOR TRANSFUSION PER CASE

## 2019-12-16 PROCEDURE — 700111 HCHG RX REV CODE 636 W/ 250 OVERRIDE (IP): Mod: JG | Performed by: SPECIALIST

## 2019-12-16 PROCEDURE — 96365 THER/PROPH/DIAG IV INF INIT: CPT

## 2019-12-16 RX ADMIN — NATALIZUMAB 300 MG: 300 INJECTION INTRAVENOUS at 11:16

## 2019-12-16 NOTE — PROGRESS NOTES
Patient seen today for Tysabri.  Plan of care discussed.  Assessment and pre-infusion patient checklist completed.  PIV established to with good blood return.  Tysabri 300 mg IV currently infusing. Call light in reach and patient educated on use.

## 2019-12-17 NOTE — PROGRESS NOTES
Infusion completed as ordered. Tolerated well. Patient observed for one hour post infusion. No s/s of adverse reactions observed or expressed. PIV removed. Gauze and coban dressing applied to skin, site wnl. Return appointment provided.

## 2020-01-27 ENCOUNTER — OUTPATIENT INFUSION SERVICES (OUTPATIENT)
Dept: ONCOLOGY | Facility: MEDICAL CENTER | Age: 60
End: 2020-01-27
Attending: PSYCHIATRY & NEUROLOGY
Payer: MEDICARE

## 2020-01-27 VITALS
WEIGHT: 220.46 LBS | BODY MASS INDEX: 41.62 KG/M2 | HEIGHT: 61 IN | TEMPERATURE: 98 F | HEART RATE: 89 BPM | SYSTOLIC BLOOD PRESSURE: 130 MMHG | RESPIRATION RATE: 18 BRPM | DIASTOLIC BLOOD PRESSURE: 82 MMHG | OXYGEN SATURATION: 96 %

## 2020-01-27 PROCEDURE — 96365 THER/PROPH/DIAG IV INF INIT: CPT

## 2020-01-27 PROCEDURE — 700105 HCHG RX REV CODE 258: Performed by: SPECIALIST

## 2020-01-27 PROCEDURE — 700111 HCHG RX REV CODE 636 W/ 250 OVERRIDE (IP): Mod: JG | Performed by: SPECIALIST

## 2020-01-27 PROCEDURE — 306780 HCHG STAT FOR TRANSFUSION PER CASE

## 2020-01-27 RX ORDER — BUDESONIDE AND FORMOTEROL FUMARATE DIHYDRATE 160; 4.5 UG/1; UG/1
AEROSOL RESPIRATORY (INHALATION)
COMMUNITY
Start: 2020-01-15 | End: 2022-04-26

## 2020-01-27 RX ORDER — ALBUTEROL SULFATE 90 UG/1
2 AEROSOL, METERED RESPIRATORY (INHALATION) EVERY 4 HOURS PRN
COMMUNITY
Start: 2020-01-21

## 2020-01-27 RX ORDER — TRIAMTERENE AND HYDROCHLOROTHIAZIDE 37.5; 25 MG/1; MG/1
1 CAPSULE ORAL DAILY
COMMUNITY
Start: 2020-01-11

## 2020-01-27 RX ADMIN — NATALIZUMAB 300 MG: 300 INJECTION INTRAVENOUS at 12:18

## 2020-01-27 NOTE — PROGRESS NOTES
Radha here for tysabri infusion. Patient reports no new or changed symptoms. TOUCH pre-infusion patient checklist completed online. Tysabri infused as ordered. Patient tolerated well. Observed by RN for one hour after infusion finished. No signed or symptoms of adverse reaction observed or expressed. Patient discharged home to self care with next appointment scheduled.

## 2020-03-09 ENCOUNTER — OUTPATIENT INFUSION SERVICES (OUTPATIENT)
Dept: ONCOLOGY | Facility: MEDICAL CENTER | Age: 60
End: 2020-03-09
Attending: SPECIALIST
Payer: MEDICARE

## 2020-03-09 VITALS
OXYGEN SATURATION: 97 % | HEART RATE: 86 BPM | TEMPERATURE: 97.5 F | HEIGHT: 61 IN | SYSTOLIC BLOOD PRESSURE: 125 MMHG | DIASTOLIC BLOOD PRESSURE: 75 MMHG | RESPIRATION RATE: 18 BRPM | WEIGHT: 216.05 LBS | BODY MASS INDEX: 40.79 KG/M2

## 2020-03-09 PROCEDURE — 306780 HCHG STAT FOR TRANSFUSION PER CASE

## 2020-03-09 PROCEDURE — 96365 THER/PROPH/DIAG IV INF INIT: CPT

## 2020-03-09 PROCEDURE — 700105 HCHG RX REV CODE 258: Performed by: SPECIALIST

## 2020-03-09 PROCEDURE — 700111 HCHG RX REV CODE 636 W/ 250 OVERRIDE (IP): Mod: JG | Performed by: SPECIALIST

## 2020-03-09 RX ADMIN — NATALIZUMAB 300 MG: 300 INJECTION INTRAVENOUS at 11:56

## 2020-03-09 ASSESSMENT — FIBROSIS 4 INDEX: FIB4 SCORE: 0.98

## 2020-03-10 NOTE — PROGRESS NOTES
PT ambulatory to [facility] for Tysabri.  Pt reports no new or changed symptoms TOUCH pre-infusion checklist completed on line.  Tysabri infused eloise LORENZANA orders.  PT tolerated well.  PT observed 1 hour post infusion.  No s/s of adverse reaction observed or expressed.  Pt discharged home to self care in [facility], next appointment scheduled.

## 2020-04-20 ENCOUNTER — OUTPATIENT INFUSION SERVICES (OUTPATIENT)
Dept: ONCOLOGY | Facility: MEDICAL CENTER | Age: 60
End: 2020-04-20
Attending: SPECIALIST
Payer: MEDICARE

## 2020-04-20 VITALS
TEMPERATURE: 97.5 F | SYSTOLIC BLOOD PRESSURE: 132 MMHG | RESPIRATION RATE: 18 BRPM | DIASTOLIC BLOOD PRESSURE: 74 MMHG | BODY MASS INDEX: 43.07 KG/M2 | WEIGHT: 219.36 LBS | HEIGHT: 60 IN | OXYGEN SATURATION: 97 % | HEART RATE: 72 BPM

## 2020-04-20 PROCEDURE — 700105 HCHG RX REV CODE 258: Performed by: SPECIALIST

## 2020-04-20 PROCEDURE — 96365 THER/PROPH/DIAG IV INF INIT: CPT

## 2020-04-20 PROCEDURE — 700111 HCHG RX REV CODE 636 W/ 250 OVERRIDE (IP): Mod: JG | Performed by: SPECIALIST

## 2020-04-20 PROCEDURE — 306780 HCHG STAT FOR TRANSFUSION PER CASE

## 2020-04-20 RX ADMIN — NATALIZUMAB 300 MG: 300 INJECTION INTRAVENOUS at 11:47

## 2020-04-20 ASSESSMENT — FIBROSIS 4 INDEX: FIB4 SCORE: 0.98

## 2020-04-20 NOTE — PROGRESS NOTES
Patient presents for Tysabri infusion. Touch questions completed on line. PIV established, flushes well with brisk blood return. Tysabri infused over one hour. Patient observed for one hour post infusion and remains stable. PIV removed, tip intact, compression dressing to site. Patient scheduled for her next appointment and released in no acute distress.

## 2020-06-01 ENCOUNTER — APPOINTMENT (OUTPATIENT)
Dept: ONCOLOGY | Facility: MEDICAL CENTER | Age: 60
End: 2020-06-01
Attending: SPECIALIST
Payer: MEDICARE

## 2020-06-08 ENCOUNTER — APPOINTMENT (OUTPATIENT)
Dept: ONCOLOGY | Facility: MEDICAL CENTER | Age: 60
End: 2020-06-08
Attending: SPECIALIST
Payer: MEDICARE

## 2020-06-16 ENCOUNTER — TELEPHONE (OUTPATIENT)
Dept: ONCOLOGY | Facility: MEDICAL CENTER | Age: 60
End: 2020-06-16

## 2020-06-17 ENCOUNTER — OUTPATIENT INFUSION SERVICES (OUTPATIENT)
Dept: ONCOLOGY | Facility: MEDICAL CENTER | Age: 60
End: 2020-06-17
Attending: SPECIALIST
Payer: MEDICARE

## 2020-06-17 VITALS
WEIGHT: 215.83 LBS | HEIGHT: 60 IN | TEMPERATURE: 98 F | RESPIRATION RATE: 18 BRPM | DIASTOLIC BLOOD PRESSURE: 72 MMHG | SYSTOLIC BLOOD PRESSURE: 131 MMHG | OXYGEN SATURATION: 100 % | HEART RATE: 71 BPM | BODY MASS INDEX: 42.37 KG/M2

## 2020-06-17 PROCEDURE — 700111 HCHG RX REV CODE 636 W/ 250 OVERRIDE (IP): Mod: JG | Performed by: SPECIALIST

## 2020-06-17 PROCEDURE — 96365 THER/PROPH/DIAG IV INF INIT: CPT

## 2020-06-17 PROCEDURE — 700105 HCHG RX REV CODE 258: Performed by: SPECIALIST

## 2020-06-17 PROCEDURE — 306780 HCHG STAT FOR TRANSFUSION PER CASE

## 2020-06-17 RX ADMIN — NATALIZUMAB 300 MG: 300 INJECTION INTRAVENOUS at 09:21

## 2020-06-17 ASSESSMENT — FIBROSIS 4 INDEX: FIB4 SCORE: 0.98

## 2020-06-17 NOTE — PROGRESS NOTES
Patient arrived to the infusion center for Tysabri. TOUCH questions completed. PIV established without difficulty and brisk blood return noted. Tysabri infused over one hour and patient observed for one hour post infusion. No reactions noted. PIV removed, tip intact, site covered with gauze and coban. Patient confirms next appointment. Left infusion center with no apparent distress.

## 2020-07-28 ENCOUNTER — TELEPHONE (OUTPATIENT)
Dept: ONCOLOGY | Facility: MEDICAL CENTER | Age: 60
End: 2020-07-28

## 2020-07-29 ENCOUNTER — OUTPATIENT INFUSION SERVICES (OUTPATIENT)
Dept: ONCOLOGY | Facility: MEDICAL CENTER | Age: 60
End: 2020-07-29
Attending: SPECIALIST
Payer: MEDICARE

## 2020-07-29 VITALS
HEIGHT: 61 IN | HEART RATE: 72 BPM | RESPIRATION RATE: 18 BRPM | WEIGHT: 216.93 LBS | TEMPERATURE: 97.7 F | SYSTOLIC BLOOD PRESSURE: 121 MMHG | BODY MASS INDEX: 40.96 KG/M2 | OXYGEN SATURATION: 96 % | DIASTOLIC BLOOD PRESSURE: 73 MMHG

## 2020-07-29 PROCEDURE — 96365 THER/PROPH/DIAG IV INF INIT: CPT

## 2020-07-29 PROCEDURE — 306780 HCHG STAT FOR TRANSFUSION PER CASE

## 2020-07-29 PROCEDURE — 700111 HCHG RX REV CODE 636 W/ 250 OVERRIDE (IP): Mod: JG | Performed by: SPECIALIST

## 2020-07-29 PROCEDURE — 700105 HCHG RX REV CODE 258: Performed by: SPECIALIST

## 2020-07-29 RX ADMIN — NATALIZUMAB 300 MG: 300 INJECTION INTRAVENOUS at 09:12

## 2020-07-29 ASSESSMENT — FIBROSIS 4 INDEX: FIB4 SCORE: 0.98

## 2020-07-29 NOTE — PROGRESS NOTES
Pt arrived to [facility], ambulatory, for Tysabri infusion. Pt voices no complaints. Touch questionnaire completed, pt within parameters to treat today. 24g PIV established in the R-AC, positive blood return noted. Tysabri infused with no s/sx of adverse reaction. 1 hour post infusion monitoring completed with no complications. PIV flushed and removed. Pt left IS with no s/sx of distress. Follow up appointment confirmed.

## 2020-09-09 ENCOUNTER — OUTPATIENT INFUSION SERVICES (OUTPATIENT)
Dept: ONCOLOGY | Facility: MEDICAL CENTER | Age: 60
End: 2020-09-09
Attending: SPECIALIST
Payer: MEDICARE

## 2020-09-09 VITALS
TEMPERATURE: 97.3 F | HEIGHT: 61 IN | RESPIRATION RATE: 18 BRPM | HEART RATE: 71 BPM | DIASTOLIC BLOOD PRESSURE: 71 MMHG | WEIGHT: 218.92 LBS | OXYGEN SATURATION: 98 % | BODY MASS INDEX: 41.33 KG/M2 | SYSTOLIC BLOOD PRESSURE: 131 MMHG

## 2020-09-09 DIAGNOSIS — G35 MULTIPLE SCLEROSIS (HCC): ICD-10-CM

## 2020-09-09 PROCEDURE — 96365 THER/PROPH/DIAG IV INF INIT: CPT

## 2020-09-09 PROCEDURE — 306780 HCHG STAT FOR TRANSFUSION PER CASE

## 2020-09-09 PROCEDURE — 700111 HCHG RX REV CODE 636 W/ 250 OVERRIDE (IP): Mod: JG | Performed by: SPECIALIST

## 2020-09-09 PROCEDURE — 700105 HCHG RX REV CODE 258: Performed by: SPECIALIST

## 2020-09-09 RX ADMIN — NATALIZUMAB 300 MG: 300 INJECTION INTRAVENOUS at 09:14

## 2020-09-09 ASSESSMENT — FIBROSIS 4 INDEX: FIB4 SCORE: 0.98

## 2020-09-09 NOTE — PROGRESS NOTES
Patient presents for Tysabri infusion. Touch questionnaire completed on line. PIV established, flushes well with brisk blood return. Tysabri infused over one hour as ordered. Patient observed for one hour post infusion and remains stable. PIV removed, tip intact, compression dressing to site. Patient scheduled and released in no acute distress.

## 2020-10-26 ENCOUNTER — OUTPATIENT INFUSION SERVICES (OUTPATIENT)
Dept: ONCOLOGY | Facility: MEDICAL CENTER | Age: 60
End: 2020-10-26
Attending: SPECIALIST
Payer: MEDICARE

## 2020-10-26 VITALS
OXYGEN SATURATION: 98 % | WEIGHT: 219.36 LBS | RESPIRATION RATE: 18 BRPM | TEMPERATURE: 98 F | DIASTOLIC BLOOD PRESSURE: 69 MMHG | SYSTOLIC BLOOD PRESSURE: 135 MMHG | HEIGHT: 61 IN | BODY MASS INDEX: 41.42 KG/M2 | HEART RATE: 86 BPM

## 2020-10-26 DIAGNOSIS — G35 MULTIPLE SCLEROSIS (HCC): ICD-10-CM

## 2020-10-26 PROCEDURE — 306780 HCHG STAT FOR TRANSFUSION PER CASE

## 2020-10-26 PROCEDURE — 96365 THER/PROPH/DIAG IV INF INIT: CPT

## 2020-10-26 PROCEDURE — 700105 HCHG RX REV CODE 258: Performed by: SPECIALIST

## 2020-10-26 PROCEDURE — 700111 HCHG RX REV CODE 636 W/ 250 OVERRIDE (IP): Mod: JG | Performed by: SPECIALIST

## 2020-10-26 RX ADMIN — NATALIZUMAB 300 MG: 300 INJECTION INTRAVENOUS at 15:55

## 2020-10-26 ASSESSMENT — FIBROSIS 4 INDEX: FIB4 SCORE: 1

## 2020-10-26 ASSESSMENT — PAIN SCALES - GENERAL: PAINLEVEL: NO PAIN

## 2020-10-27 NOTE — PROGRESS NOTES
Radha here for tysabri infusion. Radha reports no new or changed symptoms. TOUCH pre-infusion patient checklist completed online. Tysabri infused as ordered. Radha tolerated well. Radha then observed by RN for one hour after infusion finished. No signed or symptoms of adverse reaction observed or expressed. Next appointment scheduled. Discharged to self care; no apparent distress noted.

## 2020-12-02 ENCOUNTER — OUTPATIENT INFUSION SERVICES (OUTPATIENT)
Dept: ONCOLOGY | Facility: MEDICAL CENTER | Age: 60
End: 2020-12-02
Attending: SPECIALIST
Payer: MEDICARE

## 2020-12-02 VITALS
OXYGEN SATURATION: 96 % | SYSTOLIC BLOOD PRESSURE: 145 MMHG | HEART RATE: 74 BPM | BODY MASS INDEX: 43.5 KG/M2 | WEIGHT: 221.56 LBS | TEMPERATURE: 97.6 F | HEIGHT: 60 IN | DIASTOLIC BLOOD PRESSURE: 84 MMHG | RESPIRATION RATE: 18 BRPM

## 2020-12-02 DIAGNOSIS — G35 MULTIPLE SCLEROSIS (HCC): ICD-10-CM

## 2020-12-02 PROCEDURE — 700105 HCHG RX REV CODE 258: Performed by: SPECIALIST

## 2020-12-02 PROCEDURE — 306780 HCHG STAT FOR TRANSFUSION PER CASE

## 2020-12-02 PROCEDURE — 700111 HCHG RX REV CODE 636 W/ 250 OVERRIDE (IP): Mod: JG | Performed by: SPECIALIST

## 2020-12-02 PROCEDURE — 96365 THER/PROPH/DIAG IV INF INIT: CPT

## 2020-12-02 RX ADMIN — NATALIZUMAB 300 MG: 300 INJECTION INTRAVENOUS at 09:00

## 2020-12-02 ASSESSMENT — FIBROSIS 4 INDEX: FIB4 SCORE: 1

## 2020-12-02 NOTE — PROGRESS NOTES
Pt here for Tysabri infusion. In good spirits, voices no new complaints. Teaching and education reinforcement provided, also emotional support. TOUCH pre-infusion patient checklist completed online. Tysabri infused as ordered, without complications. Pt tolerated well. Pt then observed by RN for one hour after infusion finished. No signs or symptoms of adverse reaction observed or expressed. Discharge home to self care in [facility]. Will return in 6 weeks. Appointment confirm for next treatment.

## 2021-01-13 ENCOUNTER — OUTPATIENT INFUSION SERVICES (OUTPATIENT)
Dept: ONCOLOGY | Facility: MEDICAL CENTER | Age: 61
End: 2021-01-13
Attending: SPECIALIST
Payer: MEDICARE

## 2021-01-13 VITALS
TEMPERATURE: 97 F | SYSTOLIC BLOOD PRESSURE: 127 MMHG | WEIGHT: 222.66 LBS | DIASTOLIC BLOOD PRESSURE: 71 MMHG | HEIGHT: 61 IN | BODY MASS INDEX: 42.04 KG/M2 | OXYGEN SATURATION: 98 % | HEART RATE: 78 BPM | RESPIRATION RATE: 18 BRPM

## 2021-01-13 DIAGNOSIS — G35 MULTIPLE SCLEROSIS (HCC): ICD-10-CM

## 2021-01-13 PROCEDURE — M1145 MFN DRUG ADD-ON, PER DOSE: HCPCS | Performed by: SPECIALIST

## 2021-01-13 PROCEDURE — 700105 HCHG RX REV CODE 258: Performed by: SPECIALIST

## 2021-01-13 PROCEDURE — 700111 HCHG RX REV CODE 636 W/ 250 OVERRIDE (IP): Mod: JG | Performed by: SPECIALIST

## 2021-01-13 PROCEDURE — 306780 HCHG STAT FOR TRANSFUSION PER CASE

## 2021-01-13 PROCEDURE — 96365 THER/PROPH/DIAG IV INF INIT: CPT

## 2021-01-13 RX ADMIN — NATALIZUMAB 300 MG: 300 INJECTION INTRAVENOUS at 09:15

## 2021-01-13 ASSESSMENT — FIBROSIS 4 INDEX: FIB4 SCORE: 1

## 2021-01-13 NOTE — PROGRESS NOTES
Pt arrived ambulatory to IS for Tysabri infusion. Pt denied fever, signs or symptoms of infection or acute illness today. TOUCH program completed online with pt at this time. POC discussed and pt verbalized understanding.     PIV established and pt tolerated well. Tysabri administered per MAR; pt tolerated well. Pt monitored for one hour post-infusion and pt tolerated well. No signs or symptoms of reaction or complications noted. PIV flushed and removed; sterile gauze and coban applied to site.     Follow-up care discussed and next appointments scheduled/printed for pt; pt verbalized understanding. Pt discharged home to self care in no apparent distress at this time.

## 2021-02-01 RX ORDER — SODIUM CHLORIDE 9 MG/ML
INJECTION, SOLUTION INTRAVENOUS CONTINUOUS
Status: CANCELLED | OUTPATIENT
Start: 2021-02-24

## 2021-02-24 ENCOUNTER — OUTPATIENT INFUSION SERVICES (OUTPATIENT)
Dept: ONCOLOGY | Facility: MEDICAL CENTER | Age: 61
End: 2021-02-24
Attending: SPECIALIST
Payer: MEDICARE

## 2021-02-24 VITALS
HEART RATE: 86 BPM | WEIGHT: 222.66 LBS | BODY MASS INDEX: 42.04 KG/M2 | RESPIRATION RATE: 18 BRPM | TEMPERATURE: 97.8 F | SYSTOLIC BLOOD PRESSURE: 127 MMHG | HEIGHT: 61 IN | DIASTOLIC BLOOD PRESSURE: 68 MMHG | OXYGEN SATURATION: 98 %

## 2021-02-24 DIAGNOSIS — G35 MULTIPLE SCLEROSIS (HCC): ICD-10-CM

## 2021-02-24 PROCEDURE — 306780 HCHG STAT FOR TRANSFUSION PER CASE

## 2021-02-24 PROCEDURE — 96365 THER/PROPH/DIAG IV INF INIT: CPT

## 2021-02-24 PROCEDURE — 700105 HCHG RX REV CODE 258: Performed by: SPECIALIST

## 2021-02-24 PROCEDURE — 700111 HCHG RX REV CODE 636 W/ 250 OVERRIDE (IP): Mod: JG | Performed by: SPECIALIST

## 2021-02-24 RX ORDER — SODIUM CHLORIDE 9 MG/ML
INJECTION, SOLUTION INTRAVENOUS CONTINUOUS
Status: CANCELLED | OUTPATIENT
Start: 2021-02-24

## 2021-02-24 RX ORDER — ESTRADIOL AND NORETHINDRONE ACETATE .5; .1 MG/1; MG/1
1 TABLET ORAL DAILY
COMMUNITY
Start: 2021-02-17 | End: 2024-01-17

## 2021-02-24 RX ADMIN — NATALIZUMAB 300 MG: 300 INJECTION INTRAVENOUS at 09:34

## 2021-02-24 ASSESSMENT — FIBROSIS 4 INDEX: FIB4 SCORE: 1

## 2021-02-24 NOTE — PROGRESS NOTES
Pt presents to [facility] for tysabri infusion. TOUCH questionnaire complete and within parameters for treatment. Established PIV in RAC; brisk blood return observed and pt tolerated well. Tysabri infused and one hour of observation with no s/s of adverse reactions. PIV flushed and brisk blood return observed before removing; gauze/coband dressing applied. Next appointment confirmed and education provided. Pt discharged to self care with all personal belongings and in NAD.

## 2021-04-07 ENCOUNTER — OUTPATIENT INFUSION SERVICES (OUTPATIENT)
Dept: ONCOLOGY | Facility: MEDICAL CENTER | Age: 61
End: 2021-04-07
Attending: SPECIALIST
Payer: MEDICARE

## 2021-04-07 VITALS
HEART RATE: 82 BPM | HEIGHT: 59 IN | OXYGEN SATURATION: 98 % | WEIGHT: 222.66 LBS | TEMPERATURE: 97.5 F | DIASTOLIC BLOOD PRESSURE: 72 MMHG | SYSTOLIC BLOOD PRESSURE: 136 MMHG | RESPIRATION RATE: 18 BRPM | BODY MASS INDEX: 44.89 KG/M2

## 2021-04-07 DIAGNOSIS — G35 MULTIPLE SCLEROSIS (HCC): ICD-10-CM

## 2021-04-07 PROCEDURE — 700105 HCHG RX REV CODE 258: Performed by: SPECIALIST

## 2021-04-07 PROCEDURE — 306780 HCHG STAT FOR TRANSFUSION PER CASE

## 2021-04-07 PROCEDURE — 96365 THER/PROPH/DIAG IV INF INIT: CPT

## 2021-04-07 PROCEDURE — 700111 HCHG RX REV CODE 636 W/ 250 OVERRIDE (IP): Mod: JG | Performed by: SPECIALIST

## 2021-04-07 RX ORDER — OMEPRAZOLE 20 MG/1
CAPSULE, DELAYED RELEASE ORAL
COMMUNITY
Start: 2021-03-24 | End: 2021-08-10

## 2021-04-07 RX ORDER — SODIUM CHLORIDE 9 MG/ML
INJECTION, SOLUTION INTRAVENOUS CONTINUOUS
Status: CANCELLED | OUTPATIENT
Start: 2021-05-12

## 2021-04-07 RX ADMIN — NATALIZUMAB 300 MG: 300 INJECTION INTRAVENOUS at 09:04

## 2021-04-07 ASSESSMENT — FIBROSIS 4 INDEX: FIB4 SCORE: 1

## 2021-04-07 NOTE — PROGRESS NOTES
Pt presents to [facility] for tysabri infusion. TOUCH questionnaire complete and within parameters for treatment. Pt educated on need for JCV testing; pt verbalized understanding and stated [facility]. Established PIV in LAC; brisk blood return observed and pt tolerated well. Tysabri infused and one hour of observation with no s/s of adverse reactions. PIV flushed and brisk blood return observed before removing; gauze/coband dressing applied. Next appointment confirmed and education provided. Pt discharged to self care with all personal belongings and in NAD.

## 2021-05-19 ENCOUNTER — OUTPATIENT INFUSION SERVICES (OUTPATIENT)
Dept: ONCOLOGY | Facility: MEDICAL CENTER | Age: 61
End: 2021-05-19
Attending: SPECIALIST
Payer: MEDICARE

## 2021-05-19 VITALS
BODY MASS INDEX: 43.28 KG/M2 | HEIGHT: 60 IN | WEIGHT: 220.46 LBS | OXYGEN SATURATION: 97 % | HEART RATE: 88 BPM | TEMPERATURE: 98 F | RESPIRATION RATE: 18 BRPM | DIASTOLIC BLOOD PRESSURE: 83 MMHG | SYSTOLIC BLOOD PRESSURE: 123 MMHG

## 2021-05-19 DIAGNOSIS — G35 MULTIPLE SCLEROSIS (HCC): ICD-10-CM

## 2021-05-19 PROCEDURE — 96365 THER/PROPH/DIAG IV INF INIT: CPT

## 2021-05-19 PROCEDURE — 700105 HCHG RX REV CODE 258: Performed by: SPECIALIST

## 2021-05-19 PROCEDURE — 700111 HCHG RX REV CODE 636 W/ 250 OVERRIDE (IP): Mod: JG | Performed by: SPECIALIST

## 2021-05-19 PROCEDURE — 306780 HCHG STAT FOR TRANSFUSION PER CASE

## 2021-05-19 RX ORDER — SODIUM CHLORIDE 9 MG/ML
INJECTION, SOLUTION INTRAVENOUS CONTINUOUS
Status: DISCONTINUED | OUTPATIENT
Start: 2021-05-19 | End: 2021-05-19 | Stop reason: HOSPADM

## 2021-05-19 RX ORDER — SODIUM CHLORIDE 9 MG/ML
INJECTION, SOLUTION INTRAVENOUS CONTINUOUS
Status: CANCELLED | OUTPATIENT
Start: 2021-06-30

## 2021-05-19 RX ORDER — LISINOPRIL 40 MG/1
40 TABLET ORAL DAILY
COMMUNITY
Start: 2021-05-17

## 2021-05-19 RX ADMIN — SODIUM CHLORIDE: 9 INJECTION, SOLUTION INTRAVENOUS at 09:18

## 2021-05-19 RX ADMIN — NATALIZUMAB 300 MG: 300 INJECTION INTRAVENOUS at 09:37

## 2021-05-19 ASSESSMENT — FIBROSIS 4 INDEX: FIB4 SCORE: 1

## 2021-06-30 ENCOUNTER — OUTPATIENT INFUSION SERVICES (OUTPATIENT)
Dept: ONCOLOGY | Facility: MEDICAL CENTER | Age: 61
End: 2021-06-30
Attending: SPECIALIST
Payer: MEDICARE

## 2021-06-30 VITALS
HEART RATE: 74 BPM | WEIGHT: 220.68 LBS | RESPIRATION RATE: 18 BRPM | SYSTOLIC BLOOD PRESSURE: 125 MMHG | HEIGHT: 60 IN | TEMPERATURE: 97.5 F | BODY MASS INDEX: 43.33 KG/M2 | DIASTOLIC BLOOD PRESSURE: 71 MMHG | OXYGEN SATURATION: 97 %

## 2021-06-30 DIAGNOSIS — G35 MULTIPLE SCLEROSIS (HCC): ICD-10-CM

## 2021-06-30 PROCEDURE — 306780 HCHG STAT FOR TRANSFUSION PER CASE

## 2021-06-30 PROCEDURE — 700105 HCHG RX REV CODE 258: Performed by: SPECIALIST

## 2021-06-30 PROCEDURE — 96365 THER/PROPH/DIAG IV INF INIT: CPT

## 2021-06-30 PROCEDURE — 700111 HCHG RX REV CODE 636 W/ 250 OVERRIDE (IP): Mod: JG | Performed by: SPECIALIST

## 2021-06-30 RX ORDER — SODIUM CHLORIDE 9 MG/ML
INJECTION, SOLUTION INTRAVENOUS CONTINUOUS
Status: CANCELLED | OUTPATIENT
Start: 2021-08-11

## 2021-06-30 RX ADMIN — NATALIZUMAB 300 MG: 300 INJECTION INTRAVENOUS at 08:25

## 2021-06-30 ASSESSMENT — FIBROSIS 4 INDEX: FIB4 SCORE: 1

## 2021-06-30 NOTE — PROGRESS NOTES
Pt here for Tysabri infusion, for MS. In good spirits, voices no new complaints. Teaching and education reinforcement provided, also emotional support. TOUCH pre-infusion patient checklist completed online. Tysabri infused as ordered, without complications. Pt tolerated well. Pt then observed by RN for one hour after infusion finished. No signs or symptoms of adverse reaction observed or expressed. Discharge home to self care in [facility]. Will return in 6 weeks. Appointment confirm for next treatment.

## 2021-08-10 ENCOUNTER — OUTPATIENT INFUSION SERVICES (OUTPATIENT)
Dept: ONCOLOGY | Facility: MEDICAL CENTER | Age: 61
End: 2021-08-10
Attending: SPECIALIST
Payer: MEDICARE

## 2021-08-10 VITALS
DIASTOLIC BLOOD PRESSURE: 73 MMHG | RESPIRATION RATE: 18 BRPM | BODY MASS INDEX: 43.07 KG/M2 | HEIGHT: 60 IN | OXYGEN SATURATION: 100 % | SYSTOLIC BLOOD PRESSURE: 126 MMHG | TEMPERATURE: 97.9 F | HEART RATE: 77 BPM | WEIGHT: 219.36 LBS

## 2021-08-10 DIAGNOSIS — G35 MULTIPLE SCLEROSIS (HCC): ICD-10-CM

## 2021-08-10 PROCEDURE — 306780 HCHG STAT FOR TRANSFUSION PER CASE

## 2021-08-10 PROCEDURE — 96365 THER/PROPH/DIAG IV INF INIT: CPT

## 2021-08-10 PROCEDURE — 700105 HCHG RX REV CODE 258: Performed by: SPECIALIST

## 2021-08-10 PROCEDURE — 700111 HCHG RX REV CODE 636 W/ 250 OVERRIDE (IP): Mod: JG | Performed by: SPECIALIST

## 2021-08-10 RX ORDER — SODIUM CHLORIDE 9 MG/ML
INJECTION, SOLUTION INTRAVENOUS CONTINUOUS
Status: CANCELLED | OUTPATIENT
Start: 2021-08-11

## 2021-08-10 RX ADMIN — NATALIZUMAB 300 MG: 300 INJECTION INTRAVENOUS at 08:43

## 2021-08-10 ASSESSMENT — FIBROSIS 4 INDEX: FIB4 SCORE: 1

## 2021-09-21 ENCOUNTER — OUTPATIENT INFUSION SERVICES (OUTPATIENT)
Dept: ONCOLOGY | Facility: MEDICAL CENTER | Age: 61
End: 2021-09-21
Attending: SPECIALIST
Payer: MEDICARE

## 2021-09-21 VITALS
WEIGHT: 220.24 LBS | TEMPERATURE: 97 F | OXYGEN SATURATION: 98 % | DIASTOLIC BLOOD PRESSURE: 74 MMHG | SYSTOLIC BLOOD PRESSURE: 120 MMHG | BODY MASS INDEX: 43.24 KG/M2 | RESPIRATION RATE: 18 BRPM | HEART RATE: 88 BPM | HEIGHT: 60 IN

## 2021-09-21 DIAGNOSIS — G35 MULTIPLE SCLEROSIS (HCC): ICD-10-CM

## 2021-09-21 PROCEDURE — 700111 HCHG RX REV CODE 636 W/ 250 OVERRIDE (IP): Mod: JG | Performed by: SPECIALIST

## 2021-09-21 PROCEDURE — 96365 THER/PROPH/DIAG IV INF INIT: CPT

## 2021-09-21 PROCEDURE — 700105 HCHG RX REV CODE 258: Performed by: SPECIALIST

## 2021-09-21 RX ORDER — SODIUM CHLORIDE 9 MG/ML
INJECTION, SOLUTION INTRAVENOUS CONTINUOUS
Status: CANCELLED | OUTPATIENT
Start: 2021-10-22

## 2021-09-21 RX ORDER — SODIUM CHLORIDE 9 MG/ML
INJECTION, SOLUTION INTRAVENOUS CONTINUOUS
Status: DISCONTINUED | OUTPATIENT
Start: 2021-09-21 | End: 2021-09-21 | Stop reason: CLARIF

## 2021-09-21 RX ADMIN — NATALIZUMAB 300 MG: 300 INJECTION INTRAVENOUS at 08:44

## 2021-09-21 ASSESSMENT — FIBROSIS 4 INDEX: FIB4 SCORE: 1

## 2021-09-21 NOTE — PROGRESS NOTES
PT ambulatory to [facility] for [facility]ri.  Pt reports no new or changed symptoms TOUCH pre-infusion checklist completed on line.  PIV placed, brisk blood return observed.  Tysabri infused eloise LORENZANA orders.  PT tolerated well.  PT observed 1 hour post infusion.  No s/s of adverse reaction observed or expressed.  PIV flushed and removed, gauze and coban to site.  Pt discharged home to self care in [facility], next appointment scheduled.

## 2021-11-05 ENCOUNTER — OUTPATIENT INFUSION SERVICES (OUTPATIENT)
Dept: ONCOLOGY | Facility: MEDICAL CENTER | Age: 61
End: 2021-11-05
Attending: SPECIALIST
Payer: MEDICARE

## 2021-11-05 VITALS
HEIGHT: 60 IN | OXYGEN SATURATION: 96 % | TEMPERATURE: 98.3 F | RESPIRATION RATE: 18 BRPM | SYSTOLIC BLOOD PRESSURE: 136 MMHG | WEIGHT: 217.59 LBS | HEART RATE: 88 BPM | DIASTOLIC BLOOD PRESSURE: 69 MMHG | BODY MASS INDEX: 42.72 KG/M2

## 2021-11-05 DIAGNOSIS — G35 MULTIPLE SCLEROSIS (HCC): ICD-10-CM

## 2021-11-05 PROCEDURE — 700105 HCHG RX REV CODE 258: Performed by: SPECIALIST

## 2021-11-05 PROCEDURE — 96365 THER/PROPH/DIAG IV INF INIT: CPT

## 2021-11-05 PROCEDURE — 700111 HCHG RX REV CODE 636 W/ 250 OVERRIDE (IP): Mod: JG | Performed by: SPECIALIST

## 2021-11-05 RX ORDER — SODIUM CHLORIDE 9 MG/ML
INJECTION, SOLUTION INTRAVENOUS CONTINUOUS
Status: CANCELLED | OUTPATIENT
Start: 2021-12-17

## 2021-11-05 RX ADMIN — NATALIZUMAB 300 MG: 300 INJECTION INTRAVENOUS at 15:05

## 2021-11-05 ASSESSMENT — FIBROSIS 4 INDEX: FIB4 SCORE: 1.02

## 2021-11-06 NOTE — PROGRESS NOTES
Pt here for Tysabri infusion, for MS. In good spirits, voices no new complaints. Reports tolerating past treatment well. Teaching and education reinforcement provided, also emotional support. TOUCH pre-infusion patient checklist completed online. Tysabri infused as ordered, without complications. Pt tolerated well. Pt then observed by RN for one hour after infusion finished. No signs or symptoms of adverse reaction observed or expressed. Discharge home to self care in [facility]. Will return in 6 weeks. Appointment confirm for next treatment.

## 2021-12-20 ENCOUNTER — OUTPATIENT INFUSION SERVICES (OUTPATIENT)
Dept: ONCOLOGY | Facility: MEDICAL CENTER | Age: 61
End: 2021-12-20
Attending: SPECIALIST
Payer: MEDICARE

## 2021-12-20 VITALS
BODY MASS INDEX: 43.11 KG/M2 | RESPIRATION RATE: 18 BRPM | OXYGEN SATURATION: 98 % | HEIGHT: 60 IN | DIASTOLIC BLOOD PRESSURE: 86 MMHG | WEIGHT: 219.58 LBS | HEART RATE: 78 BPM | TEMPERATURE: 97.1 F | SYSTOLIC BLOOD PRESSURE: 124 MMHG

## 2021-12-20 DIAGNOSIS — G35 MULTIPLE SCLEROSIS (HCC): ICD-10-CM

## 2021-12-20 PROCEDURE — 700105 HCHG RX REV CODE 258: Performed by: SPECIALIST

## 2021-12-20 PROCEDURE — 700111 HCHG RX REV CODE 636 W/ 250 OVERRIDE (IP): Mod: JG | Performed by: SPECIALIST

## 2021-12-20 PROCEDURE — 96365 THER/PROPH/DIAG IV INF INIT: CPT

## 2021-12-20 RX ORDER — SODIUM CHLORIDE 9 MG/ML
INJECTION, SOLUTION INTRAVENOUS CONTINUOUS
Status: DISCONTINUED | OUTPATIENT
Start: 2021-12-20 | End: 2021-12-20 | Stop reason: HOSPADM

## 2021-12-20 RX ORDER — SODIUM CHLORIDE 9 MG/ML
INJECTION, SOLUTION INTRAVENOUS CONTINUOUS
Status: CANCELLED | OUTPATIENT
Start: 2022-01-28

## 2021-12-20 RX ADMIN — NATALIZUMAB 300 MG: 300 INJECTION INTRAVENOUS at 12:03

## 2021-12-20 RX ADMIN — SODIUM CHLORIDE: 9 INJECTION, SOLUTION INTRAVENOUS at 12:03

## 2021-12-20 NOTE — PROGRESS NOTES
Radha [facility] for Q6 week Tysabri infusion. TOUCH online questionnaire completed. PIV established, brisk blood return noted. Pt medicated per MAR. Pt monitored x 1 hour post infusion. Pt tolerated treatment without s/s adverse reaction. PIV dc'd catheter tip intact, guaze and coban dressing applied. Pt discharged to self care, [facility]. Pt's net appt confirmed.

## 2022-02-01 ENCOUNTER — OUTPATIENT INFUSION SERVICES (OUTPATIENT)
Dept: ONCOLOGY | Facility: MEDICAL CENTER | Age: 62
End: 2022-02-01
Attending: SPECIALIST
Payer: MEDICARE

## 2022-02-01 VITALS
RESPIRATION RATE: 18 BRPM | TEMPERATURE: 98.8 F | HEIGHT: 60 IN | DIASTOLIC BLOOD PRESSURE: 82 MMHG | SYSTOLIC BLOOD PRESSURE: 166 MMHG | WEIGHT: 218.26 LBS | OXYGEN SATURATION: 94 % | HEART RATE: 77 BPM | BODY MASS INDEX: 42.85 KG/M2

## 2022-02-01 DIAGNOSIS — G35 MULTIPLE SCLEROSIS (HCC): ICD-10-CM

## 2022-02-01 PROCEDURE — 96365 THER/PROPH/DIAG IV INF INIT: CPT

## 2022-02-01 PROCEDURE — 700111 HCHG RX REV CODE 636 W/ 250 OVERRIDE (IP): Mod: JG | Performed by: SPECIALIST

## 2022-02-01 PROCEDURE — 700105 HCHG RX REV CODE 258: Performed by: SPECIALIST

## 2022-02-01 RX ORDER — SODIUM CHLORIDE 9 MG/ML
INJECTION, SOLUTION INTRAVENOUS CONTINUOUS
Status: CANCELLED | OUTPATIENT
Start: 2022-02-01

## 2022-02-01 RX ADMIN — NATALIZUMAB 300 MG: 300 INJECTION INTRAVENOUS at 10:03

## 2022-02-01 NOTE — PROGRESS NOTES
Pt arrives to IS for Tysabri infusion for MS.  Pt denies s/s of infection or worsening of MS symptoms.  TOUCH online checklist completed.  PIV established to R-AC.  Tysabri infused without issue.  One hour observation completed without adverse reaction.  PIV flushed with NS and site removed.  Site wrapped with pressure dressing.  Confirmed next appt time in 6 weeks with pt.  Scheduled out additional appts for pt and gave her a copy of schedule.  Pt dc home to self care.

## 2022-03-15 ENCOUNTER — OUTPATIENT INFUSION SERVICES (OUTPATIENT)
Dept: ONCOLOGY | Facility: MEDICAL CENTER | Age: 62
End: 2022-03-15
Attending: SPECIALIST
Payer: MEDICARE

## 2022-03-15 VITALS
HEIGHT: 60 IN | DIASTOLIC BLOOD PRESSURE: 71 MMHG | BODY MASS INDEX: 43.63 KG/M2 | TEMPERATURE: 98 F | SYSTOLIC BLOOD PRESSURE: 127 MMHG | RESPIRATION RATE: 18 BRPM | HEART RATE: 82 BPM | OXYGEN SATURATION: 98 % | WEIGHT: 222.22 LBS

## 2022-03-15 DIAGNOSIS — G35 MULTIPLE SCLEROSIS (HCC): ICD-10-CM

## 2022-03-15 PROCEDURE — 700111 HCHG RX REV CODE 636 W/ 250 OVERRIDE (IP): Mod: JG | Performed by: SPECIALIST

## 2022-03-15 PROCEDURE — 700105 HCHG RX REV CODE 258: Performed by: SPECIALIST

## 2022-03-15 PROCEDURE — 96365 THER/PROPH/DIAG IV INF INIT: CPT

## 2022-03-15 RX ORDER — SODIUM CHLORIDE 9 MG/ML
INJECTION, SOLUTION INTRAVENOUS CONTINUOUS
Status: CANCELLED | OUTPATIENT
Start: 2022-04-26

## 2022-03-15 RX ADMIN — NATALIZUMAB 300 MG: 300 INJECTION INTRAVENOUS at 10:14

## 2022-03-15 NOTE — PROGRESS NOTES
[facility]. Touch on-line questionnaire answered. PIV started to right AC, brisk blood return noted. Tysabri infused over one hour, no s/s of reaction. PIV flushed, removed, gauze and coban to site. Next appointment confirmed and patient discharged to self care in no apparent distress.

## 2022-04-26 ENCOUNTER — OUTPATIENT INFUSION SERVICES (OUTPATIENT)
Dept: ONCOLOGY | Facility: MEDICAL CENTER | Age: 62
End: 2022-04-26
Attending: SPECIALIST
Payer: MEDICARE

## 2022-04-26 VITALS
TEMPERATURE: 98 F | SYSTOLIC BLOOD PRESSURE: 132 MMHG | RESPIRATION RATE: 18 BRPM | BODY MASS INDEX: 43.46 KG/M2 | WEIGHT: 221.34 LBS | DIASTOLIC BLOOD PRESSURE: 86 MMHG | HEART RATE: 76 BPM | OXYGEN SATURATION: 98 % | HEIGHT: 60 IN

## 2022-04-26 DIAGNOSIS — G35 MULTIPLE SCLEROSIS (HCC): ICD-10-CM

## 2022-04-26 PROCEDURE — 96365 THER/PROPH/DIAG IV INF INIT: CPT

## 2022-04-26 PROCEDURE — 700105 HCHG RX REV CODE 258: Performed by: SPECIALIST

## 2022-04-26 PROCEDURE — 700111 HCHG RX REV CODE 636 W/ 250 OVERRIDE (IP): Mod: JG | Performed by: SPECIALIST

## 2022-04-26 RX ORDER — SODIUM CHLORIDE 9 MG/ML
INJECTION, SOLUTION INTRAVENOUS CONTINUOUS
Status: CANCELLED | OUTPATIENT
Start: 2022-06-07

## 2022-04-26 RX ADMIN — NATALIZUMAB 300 MG: 300 INJECTION INTRAVENOUS at 11:43

## 2022-04-26 NOTE — PROGRESS NOTES
Pt arrives to IS for Tysabri infusion for MS that is ordered every 6 weeks.  Pt denies s/s of infection or worsening of MS symptoms.  TOUCH online checklist completed.  PIV established to R-AC.  Tysabri infused without issue.  NO observation required since pt has received more than 12 Tysabri infusions without adverse reactions.  PIV flushed with NS and site removed.  Site wrapped with pressure dressing.  Confirmed next appt time with pt.  Pt dc home to self care.

## 2022-06-07 ENCOUNTER — OUTPATIENT INFUSION SERVICES (OUTPATIENT)
Dept: ONCOLOGY | Facility: MEDICAL CENTER | Age: 62
End: 2022-06-07
Attending: SPECIALIST
Payer: MEDICARE

## 2022-06-07 VITALS
HEIGHT: 61 IN | BODY MASS INDEX: 41.42 KG/M2 | RESPIRATION RATE: 18 BRPM | HEART RATE: 78 BPM | SYSTOLIC BLOOD PRESSURE: 135 MMHG | DIASTOLIC BLOOD PRESSURE: 81 MMHG | OXYGEN SATURATION: 93 % | TEMPERATURE: 97.8 F | WEIGHT: 219.36 LBS

## 2022-06-07 DIAGNOSIS — G35 MULTIPLE SCLEROSIS (HCC): ICD-10-CM

## 2022-06-07 PROCEDURE — 700105 HCHG RX REV CODE 258: Performed by: SPECIALIST

## 2022-06-07 PROCEDURE — 700111 HCHG RX REV CODE 636 W/ 250 OVERRIDE (IP): Mod: JG | Performed by: SPECIALIST

## 2022-06-07 PROCEDURE — 96365 THER/PROPH/DIAG IV INF INIT: CPT

## 2022-06-07 RX ORDER — SODIUM CHLORIDE 9 MG/ML
INJECTION, SOLUTION INTRAVENOUS CONTINUOUS
Status: CANCELLED | OUTPATIENT
Start: 2022-07-19

## 2022-06-07 RX ADMIN — NATALIZUMAB 300 MG: 300 INJECTION INTRAVENOUS at 09:45

## 2022-06-07 NOTE — PROGRESS NOTES
Radha ambulatory to [facility] for Tysabri.  She reports no new or changed symptoms.   PIV placed, brisk blood return observed.  Tysabri infused eloise LORENZANA orders.  PT tolerated well.   No s/s of adverse reaction observed or expressed.  PIV flushed and removed, gauze and coban to site.  Radha discharged home to self care in [facility], next appointment scheduled.

## 2022-07-19 ENCOUNTER — OUTPATIENT INFUSION SERVICES (OUTPATIENT)
Dept: ONCOLOGY | Facility: MEDICAL CENTER | Age: 62
End: 2022-07-19
Attending: SPECIALIST
Payer: MEDICARE

## 2022-07-19 VITALS
RESPIRATION RATE: 18 BRPM | HEIGHT: 61 IN | OXYGEN SATURATION: 99 % | HEART RATE: 79 BPM | TEMPERATURE: 97.5 F | DIASTOLIC BLOOD PRESSURE: 82 MMHG | BODY MASS INDEX: 42.37 KG/M2 | WEIGHT: 224.43 LBS | SYSTOLIC BLOOD PRESSURE: 131 MMHG

## 2022-07-19 DIAGNOSIS — G35 MULTIPLE SCLEROSIS (HCC): ICD-10-CM

## 2022-07-19 PROCEDURE — 96365 THER/PROPH/DIAG IV INF INIT: CPT

## 2022-07-19 PROCEDURE — 700105 HCHG RX REV CODE 258: Performed by: SPECIALIST

## 2022-07-19 PROCEDURE — 700111 HCHG RX REV CODE 636 W/ 250 OVERRIDE (IP): Mod: JG | Performed by: SPECIALIST

## 2022-07-19 RX ORDER — SODIUM CHLORIDE 9 MG/ML
INJECTION, SOLUTION INTRAVENOUS CONTINUOUS
Status: CANCELLED | OUTPATIENT
Start: 2022-07-28

## 2022-07-19 RX ADMIN — NATALIZUMAB 300 MG: 300 INJECTION INTRAVENOUS at 10:01

## 2022-08-30 ENCOUNTER — OUTPATIENT INFUSION SERVICES (OUTPATIENT)
Dept: ONCOLOGY | Facility: MEDICAL CENTER | Age: 62
End: 2022-08-30
Attending: PSYCHIATRY & NEUROLOGY
Payer: MEDICARE

## 2022-08-30 VITALS
OXYGEN SATURATION: 98 % | HEART RATE: 84 BPM | SYSTOLIC BLOOD PRESSURE: 136 MMHG | RESPIRATION RATE: 18 BRPM | BODY MASS INDEX: 43.24 KG/M2 | WEIGHT: 220.24 LBS | DIASTOLIC BLOOD PRESSURE: 82 MMHG | HEIGHT: 60 IN | TEMPERATURE: 98 F

## 2022-08-30 DIAGNOSIS — G35 MULTIPLE SCLEROSIS (HCC): ICD-10-CM

## 2022-08-30 PROCEDURE — 700111 HCHG RX REV CODE 636 W/ 250 OVERRIDE (IP): Mod: JG | Performed by: SPECIALIST

## 2022-08-30 PROCEDURE — 96365 THER/PROPH/DIAG IV INF INIT: CPT

## 2022-08-30 PROCEDURE — 700105 HCHG RX REV CODE 258: Performed by: SPECIALIST

## 2022-08-30 RX ORDER — SODIUM CHLORIDE 9 MG/ML
INJECTION, SOLUTION INTRAVENOUS CONTINUOUS
Status: CANCELLED | OUTPATIENT
Start: 2022-10-11

## 2022-08-30 RX ORDER — IBUPROFEN 600 MG/1
600 TABLET ORAL
Status: ON HOLD | COMMUNITY
Start: 2022-06-17 | End: 2023-06-07

## 2022-08-30 RX ADMIN — NATALIZUMAB 300 MG: 300 INJECTION INTRAVENOUS at 10:03

## 2022-08-30 NOTE — PROGRESS NOTES
PT ambulatory to [facility] for Tysabri.  Pt reports no new or changed symptoms TOUCH pre-infusion checklist completed on line.  PIV placed, brisk blood return observed.  Tysabri infused eloise LORENZANA orders.  PT tolerated well.  No s/s of adverse reaction observed or expressed.  PIV flushed and removed, gauze and coban to site.  Pt discharged home to self care in [facility], next appointment scheduled.

## 2022-10-12 ENCOUNTER — OUTPATIENT INFUSION SERVICES (OUTPATIENT)
Dept: ONCOLOGY | Facility: MEDICAL CENTER | Age: 62
End: 2022-10-12
Attending: PSYCHIATRY & NEUROLOGY
Payer: MEDICARE

## 2022-10-12 VITALS
DIASTOLIC BLOOD PRESSURE: 82 MMHG | OXYGEN SATURATION: 98 % | BODY MASS INDEX: 43.15 KG/M2 | TEMPERATURE: 97.4 F | HEIGHT: 60 IN | HEART RATE: 79 BPM | SYSTOLIC BLOOD PRESSURE: 130 MMHG | WEIGHT: 219.8 LBS | RESPIRATION RATE: 16 BRPM

## 2022-10-12 DIAGNOSIS — G35 MULTIPLE SCLEROSIS (HCC): ICD-10-CM

## 2022-10-12 PROCEDURE — 700105 HCHG RX REV CODE 258: Performed by: SPECIALIST

## 2022-10-12 PROCEDURE — 96365 THER/PROPH/DIAG IV INF INIT: CPT

## 2022-10-12 PROCEDURE — 700111 HCHG RX REV CODE 636 W/ 250 OVERRIDE (IP): Mod: JG | Performed by: SPECIALIST

## 2022-10-12 RX ORDER — SODIUM CHLORIDE 9 MG/ML
INJECTION, SOLUTION INTRAVENOUS CONTINUOUS
Status: CANCELLED | OUTPATIENT
Start: 2022-11-23

## 2022-10-12 RX ADMIN — NATALIZUMAB 300 MG: 300 INJECTION INTRAVENOUS at 10:05

## 2022-10-12 NOTE — PROGRESS NOTES
Pt arrived to [facility] for Tysabri. TOUCH program checklist completed. Denies recent illness or s/s of infection. PIV started in R AC, brisk blood return noted. Tysabri infused over 1 hour without adverse reaction. Pt does not need to stay for observation, she has received more than 12 doses. Confirmed next appt. PIV removed and pt left in stable condition.

## 2022-11-23 ENCOUNTER — OUTPATIENT INFUSION SERVICES (OUTPATIENT)
Dept: ONCOLOGY | Facility: MEDICAL CENTER | Age: 62
End: 2022-11-23
Attending: PSYCHIATRY & NEUROLOGY
Payer: MEDICARE

## 2022-11-23 VITALS
HEART RATE: 83 BPM | RESPIRATION RATE: 16 BRPM | BODY MASS INDEX: 43.11 KG/M2 | TEMPERATURE: 97.7 F | OXYGEN SATURATION: 97 % | WEIGHT: 219.58 LBS | SYSTOLIC BLOOD PRESSURE: 122 MMHG | DIASTOLIC BLOOD PRESSURE: 73 MMHG | HEIGHT: 60 IN

## 2022-11-23 DIAGNOSIS — G35 MULTIPLE SCLEROSIS (HCC): ICD-10-CM

## 2022-11-23 PROCEDURE — 700111 HCHG RX REV CODE 636 W/ 250 OVERRIDE (IP): Mod: JG | Performed by: SPECIALIST

## 2022-11-23 PROCEDURE — 96365 THER/PROPH/DIAG IV INF INIT: CPT

## 2022-11-23 PROCEDURE — 700105 HCHG RX REV CODE 258: Performed by: SPECIALIST

## 2022-11-23 RX ORDER — ALPRAZOLAM 0.5 MG/1
TABLET ORAL
Status: ON HOLD | COMMUNITY
Start: 2022-10-19 | End: 2023-06-07

## 2022-11-23 RX ORDER — SODIUM CHLORIDE 9 MG/ML
INJECTION, SOLUTION INTRAVENOUS CONTINUOUS
Status: CANCELLED | OUTPATIENT
Start: 2022-11-23

## 2022-11-23 RX ORDER — BUPROPION HYDROCHLORIDE 150 MG/1
150 TABLET, EXTENDED RELEASE ORAL DAILY
COMMUNITY

## 2022-11-23 RX ADMIN — NATALIZUMAB 300 MG: 300 INJECTION INTRAVENOUS at 09:50

## 2022-11-23 NOTE — PROGRESS NOTES
Pt presents to [facility] for tysabri infusion. TOUCH questionnaire complete and within parameters for treatment. Established PIV in R AC; brisk blood return observed and pt tolerated well. Tysabri infused over an hour, patient no longer needs one hour of observation; no s/s of adverse reactions. PIV flushed and brisk blood return observed before removing; gauze/coband dressing applied. Next appointment to be scheduled in 6 weeks, schedulers emailed, pt uses [facility] and knows to look their for appt notification. Pt discharged to self care with all personal belongings and in NAD.

## 2023-01-04 ENCOUNTER — OUTPATIENT INFUSION SERVICES (OUTPATIENT)
Dept: ONCOLOGY | Facility: MEDICAL CENTER | Age: 63
End: 2023-01-04
Attending: PSYCHIATRY & NEUROLOGY
Payer: MEDICARE

## 2023-01-04 VITALS
WEIGHT: 217.59 LBS | RESPIRATION RATE: 18 BRPM | TEMPERATURE: 97.9 F | SYSTOLIC BLOOD PRESSURE: 115 MMHG | OXYGEN SATURATION: 96 % | BODY MASS INDEX: 42.72 KG/M2 | HEIGHT: 60 IN | DIASTOLIC BLOOD PRESSURE: 76 MMHG | HEART RATE: 89 BPM

## 2023-01-04 DIAGNOSIS — G35 MULTIPLE SCLEROSIS (HCC): ICD-10-CM

## 2023-01-04 PROCEDURE — 96365 THER/PROPH/DIAG IV INF INIT: CPT

## 2023-01-04 PROCEDURE — 700111 HCHG RX REV CODE 636 W/ 250 OVERRIDE (IP): Mod: JG | Performed by: SPECIALIST

## 2023-01-04 PROCEDURE — 700105 HCHG RX REV CODE 258: Performed by: SPECIALIST

## 2023-01-04 RX ORDER — SODIUM CHLORIDE 9 MG/ML
INJECTION, SOLUTION INTRAVENOUS CONTINUOUS
Status: CANCELLED | OUTPATIENT
Start: 2023-02-08

## 2023-01-04 RX ADMIN — NATALIZUMAB 300 MG: 300 INJECTION INTRAVENOUS at 15:31

## 2023-01-04 ASSESSMENT — FIBROSIS 4 INDEX: FIB4 SCORE: 1.09

## 2023-01-05 NOTE — PROGRESS NOTES
Patient to [facility] for Tysabri infusion .Touch program performed.PIV inserted into left upper forearm, flushed with + blood return. Tysabri infused with no s/s of infusion reaction. PIV flushed with + blood return and removed. Gauze and coban applied as a dressing. Emailed scheduling for future appointment. Patient to home in care of self.

## 2023-02-22 ENCOUNTER — OUTPATIENT INFUSION SERVICES (OUTPATIENT)
Dept: ONCOLOGY | Facility: MEDICAL CENTER | Age: 63
End: 2023-02-22
Attending: PSYCHIATRY & NEUROLOGY
Payer: MEDICARE

## 2023-02-22 VITALS
TEMPERATURE: 98.2 F | BODY MASS INDEX: 40.54 KG/M2 | SYSTOLIC BLOOD PRESSURE: 136 MMHG | HEIGHT: 61 IN | HEART RATE: 84 BPM | WEIGHT: 214.73 LBS | DIASTOLIC BLOOD PRESSURE: 76 MMHG | OXYGEN SATURATION: 97 % | RESPIRATION RATE: 18 BRPM

## 2023-02-22 DIAGNOSIS — G35 MULTIPLE SCLEROSIS (HCC): ICD-10-CM

## 2023-02-22 PROCEDURE — 700111 HCHG RX REV CODE 636 W/ 250 OVERRIDE (IP): Mod: JG | Performed by: SPECIALIST

## 2023-02-22 PROCEDURE — 96365 THER/PROPH/DIAG IV INF INIT: CPT

## 2023-02-22 PROCEDURE — 700105 HCHG RX REV CODE 258: Performed by: SPECIALIST

## 2023-02-22 RX ORDER — SODIUM CHLORIDE 9 MG/ML
INJECTION, SOLUTION INTRAVENOUS CONTINUOUS
Status: CANCELLED | OUTPATIENT
Start: 2023-03-29

## 2023-02-22 RX ADMIN — NATALIZUMAB 300 MG: 300 INJECTION INTRAVENOUS at 11:29

## 2023-02-22 ASSESSMENT — FIBROSIS 4 INDEX: FIB4 SCORE: 1.09

## 2023-02-23 NOTE — PROGRESS NOTES
Pt presented to infusion center for Tysabri. Denies any current s/s of infection or open wounds. TOUCH checklist completed. PIV started, brisk blood return observed. Tysabri infused with no s/s of adverse reaction. PIV flushed and removed, gauze and coban dressing placed. Pt has next appt. Left in stable condition.

## 2023-04-05 ENCOUNTER — OUTPATIENT INFUSION SERVICES (OUTPATIENT)
Dept: ONCOLOGY | Facility: MEDICAL CENTER | Age: 63
End: 2023-04-05
Attending: PSYCHIATRY & NEUROLOGY
Payer: MEDICARE

## 2023-04-05 VITALS
SYSTOLIC BLOOD PRESSURE: 113 MMHG | HEART RATE: 75 BPM | HEIGHT: 61 IN | RESPIRATION RATE: 16 BRPM | TEMPERATURE: 97.9 F | WEIGHT: 212.3 LBS | DIASTOLIC BLOOD PRESSURE: 76 MMHG | BODY MASS INDEX: 40.08 KG/M2 | OXYGEN SATURATION: 97 %

## 2023-04-05 DIAGNOSIS — G35 MULTIPLE SCLEROSIS (HCC): ICD-10-CM

## 2023-04-05 PROCEDURE — 700105 HCHG RX REV CODE 258: Performed by: SPECIALIST

## 2023-04-05 PROCEDURE — 700111 HCHG RX REV CODE 636 W/ 250 OVERRIDE (IP): Mod: JG | Performed by: SPECIALIST

## 2023-04-05 PROCEDURE — 96365 THER/PROPH/DIAG IV INF INIT: CPT

## 2023-04-05 RX ORDER — SODIUM CHLORIDE 9 MG/ML
INJECTION, SOLUTION INTRAVENOUS CONTINUOUS
Status: CANCELLED | OUTPATIENT
Start: 2023-05-17

## 2023-04-05 RX ADMIN — NATALIZUMAB 300 MG: 300 INJECTION INTRAVENOUS at 11:44

## 2023-04-05 ASSESSMENT — FIBROSIS 4 INDEX: FIB4 SCORE: 1.09

## 2023-04-05 NOTE — PROGRESS NOTES
Radha here for tysabri infusion. Radha reports no new or changed symptoms. TOUCH pre-infusion patient checklist completed online. Tysabri infused as ordered. Radha tolerated well. Next appointment scheduled, Radha discharged home to self care.

## 2023-05-25 ENCOUNTER — OUTPATIENT INFUSION SERVICES (OUTPATIENT)
Dept: ONCOLOGY | Facility: MEDICAL CENTER | Age: 63
End: 2023-05-25
Attending: SPECIALIST
Payer: MEDICARE

## 2023-05-25 VITALS
HEART RATE: 79 BPM | BODY MASS INDEX: 40.5 KG/M2 | SYSTOLIC BLOOD PRESSURE: 152 MMHG | DIASTOLIC BLOOD PRESSURE: 83 MMHG | TEMPERATURE: 97.6 F | WEIGHT: 214.51 LBS | OXYGEN SATURATION: 98 % | RESPIRATION RATE: 18 BRPM | HEIGHT: 61 IN

## 2023-05-25 DIAGNOSIS — G35 MULTIPLE SCLEROSIS (HCC): ICD-10-CM

## 2023-05-25 PROCEDURE — 96365 THER/PROPH/DIAG IV INF INIT: CPT

## 2023-05-25 PROCEDURE — 700105 HCHG RX REV CODE 258: Performed by: SPECIALIST

## 2023-05-25 PROCEDURE — 700111 HCHG RX REV CODE 636 W/ 250 OVERRIDE (IP): Mod: JG | Performed by: SPECIALIST

## 2023-05-25 RX ORDER — SODIUM CHLORIDE 9 MG/ML
INJECTION, SOLUTION INTRAVENOUS CONTINUOUS
Status: CANCELLED | OUTPATIENT
Start: 2023-06-29

## 2023-05-25 RX ADMIN — NATALIZUMAB 300 MG: 300 INJECTION INTRAVENOUS at 13:44

## 2023-05-25 ASSESSMENT — FIBROSIS 4 INDEX: FIB4 SCORE: 1.15

## 2023-05-25 NOTE — PROGRESS NOTES
Patient came to infusion independently. Orders and vital signs reviewed, assessment done. PIV started with blood return noted. Tysabri checklist completed. Tysabri infused as ordered with no adverse events. Patient does not need post hour observation. PIV flushed post infusion with blood return noted. PIV removed with tip intact. Pt left in stable condition with next appointment confirmed.

## 2023-06-07 PROBLEM — S83.282D OTHER TEAR OF LATERAL MENISCUS, CURRENT INJURY, LEFT KNEE, SUBSEQUENT ENCOUNTER: Status: ACTIVE | Noted: 2023-06-07

## 2023-07-06 ENCOUNTER — OUTPATIENT INFUSION SERVICES (OUTPATIENT)
Dept: ONCOLOGY | Facility: MEDICAL CENTER | Age: 63
End: 2023-07-06
Attending: SPECIALIST
Payer: MEDICARE

## 2023-07-06 VITALS
BODY MASS INDEX: 41.42 KG/M2 | OXYGEN SATURATION: 97 % | SYSTOLIC BLOOD PRESSURE: 136 MMHG | HEIGHT: 61 IN | RESPIRATION RATE: 18 BRPM | WEIGHT: 219.36 LBS | DIASTOLIC BLOOD PRESSURE: 80 MMHG | HEART RATE: 77 BPM | TEMPERATURE: 97.8 F

## 2023-07-06 DIAGNOSIS — G35 MULTIPLE SCLEROSIS (HCC): ICD-10-CM

## 2023-07-06 PROCEDURE — 700105 HCHG RX REV CODE 258: Mod: JZ | Performed by: SPECIALIST

## 2023-07-06 PROCEDURE — 96365 THER/PROPH/DIAG IV INF INIT: CPT

## 2023-07-06 PROCEDURE — 700111 HCHG RX REV CODE 636 W/ 250 OVERRIDE (IP): Mod: JZ,JG | Performed by: SPECIALIST

## 2023-07-06 RX ORDER — SODIUM CHLORIDE 9 MG/ML
INJECTION, SOLUTION INTRAVENOUS CONTINUOUS
Status: CANCELLED | OUTPATIENT
Start: 2023-08-17

## 2023-07-06 RX ORDER — SODIUM CHLORIDE 9 MG/ML
INJECTION, SOLUTION INTRAVENOUS CONTINUOUS
Status: DISCONTINUED | OUTPATIENT
Start: 2023-07-06 | End: 2023-07-06 | Stop reason: HOSPADM

## 2023-07-06 RX ADMIN — NATALIZUMAB 300 MG: 300 INJECTION INTRAVENOUS at 09:33

## 2023-07-06 RX ADMIN — SODIUM CHLORIDE: 9 INJECTION, SOLUTION INTRAVENOUS at 09:33

## 2023-07-06 ASSESSMENT — FIBROSIS 4 INDEX: FIB4 SCORE: 1.15

## 2023-07-06 NOTE — PROGRESS NOTES
Pt arrived ambulatory to [facility] for Q 6 week Tysabri infusion for MS. POC discussed with pt and she agrees with plan TOUCH online questionnaire completed.     PIV established, brisk blood return noted. Pt medicated per MAR. Pt tolerated treatment without s/s adverse reaction. PIV dc'd catheter tip intact, gauze and coban dressing applied. Pt discharged to self care, [facility]. Scheduling emailed for future appts.

## 2023-08-17 ENCOUNTER — OUTPATIENT INFUSION SERVICES (OUTPATIENT)
Dept: ONCOLOGY | Facility: MEDICAL CENTER | Age: 63
End: 2023-08-17
Attending: SPECIALIST
Payer: MEDICARE

## 2023-08-17 VITALS
OXYGEN SATURATION: 98 % | BODY MASS INDEX: 41.29 KG/M2 | HEIGHT: 61 IN | RESPIRATION RATE: 18 BRPM | TEMPERATURE: 97.9 F | WEIGHT: 218.7 LBS | HEART RATE: 76 BPM | DIASTOLIC BLOOD PRESSURE: 81 MMHG | SYSTOLIC BLOOD PRESSURE: 125 MMHG

## 2023-08-17 DIAGNOSIS — G35 MULTIPLE SCLEROSIS (HCC): ICD-10-CM

## 2023-08-17 PROCEDURE — 700105 HCHG RX REV CODE 258: Performed by: SPECIALIST

## 2023-08-17 PROCEDURE — 700111 HCHG RX REV CODE 636 W/ 250 OVERRIDE (IP): Mod: JZ,JG | Performed by: SPECIALIST

## 2023-08-17 PROCEDURE — 96365 THER/PROPH/DIAG IV INF INIT: CPT

## 2023-08-17 RX ORDER — SODIUM CHLORIDE 9 MG/ML
INJECTION, SOLUTION INTRAVENOUS CONTINUOUS
Status: CANCELLED | OUTPATIENT
Start: 2023-09-28

## 2023-08-17 RX ADMIN — NATALIZUMAB 300 MG: 300 INJECTION INTRAVENOUS at 08:51

## 2023-08-17 ASSESSMENT — FIBROSIS 4 INDEX: FIB4 SCORE: 1.15

## 2023-08-17 NOTE — PROGRESS NOTES
Radha here for Tysabri infusion. Radha reports no new or changed symptoms. TOUCH pre-infusion patient checklist completed online. Tysabri infused as ordered. Radha tolerated well. Next appointment scheduled. Discharged to self care; no apparent distress noted.

## 2023-09-28 ENCOUNTER — OUTPATIENT INFUSION SERVICES (OUTPATIENT)
Dept: ONCOLOGY | Facility: MEDICAL CENTER | Age: 63
End: 2023-09-28
Attending: SPECIALIST
Payer: MEDICARE

## 2023-09-28 VITALS
TEMPERATURE: 97.6 F | DIASTOLIC BLOOD PRESSURE: 80 MMHG | SYSTOLIC BLOOD PRESSURE: 156 MMHG | RESPIRATION RATE: 18 BRPM | BODY MASS INDEX: 40.67 KG/M2 | WEIGHT: 215.39 LBS | HEART RATE: 68 BPM | HEIGHT: 61 IN | OXYGEN SATURATION: 98 %

## 2023-09-28 DIAGNOSIS — G35 MULTIPLE SCLEROSIS (HCC): ICD-10-CM

## 2023-09-28 PROCEDURE — 700111 HCHG RX REV CODE 636 W/ 250 OVERRIDE (IP): Mod: JZ,JG | Performed by: SPECIALIST

## 2023-09-28 PROCEDURE — 700105 HCHG RX REV CODE 258: Performed by: SPECIALIST

## 2023-09-28 PROCEDURE — 96365 THER/PROPH/DIAG IV INF INIT: CPT

## 2023-09-28 RX ORDER — SODIUM CHLORIDE 9 MG/ML
INJECTION, SOLUTION INTRAVENOUS CONTINUOUS
Status: CANCELLED | OUTPATIENT
Start: 2023-11-09

## 2023-09-28 RX ADMIN — NATALIZUMAB 300 MG: 300 INJECTION INTRAVENOUS at 15:29

## 2023-09-28 ASSESSMENT — FIBROSIS 4 INDEX: FIB4 SCORE: 1.15

## 2023-09-28 NOTE — PROGRESS NOTES
Patient came to infusion independently. Orders and vital signs reviewed, assessment done. PIV started with blood return noted. Tysabri pre-infusion checklist completed. Tysabri infused as ordered with no adverse events. Patient does not need post hour observation. PIV flushed post infusion with blood return noted. PIV removed with tip intact. Pt left in stable condition with next appointment confirmed.

## 2023-11-09 ENCOUNTER — OUTPATIENT INFUSION SERVICES (OUTPATIENT)
Dept: ONCOLOGY | Facility: MEDICAL CENTER | Age: 63
End: 2023-11-09
Attending: SPECIALIST
Payer: MEDICARE

## 2023-11-09 VITALS
SYSTOLIC BLOOD PRESSURE: 122 MMHG | TEMPERATURE: 97.2 F | OXYGEN SATURATION: 97 % | HEART RATE: 74 BPM | DIASTOLIC BLOOD PRESSURE: 73 MMHG | BODY MASS INDEX: 40.67 KG/M2 | WEIGHT: 215.39 LBS | RESPIRATION RATE: 18 BRPM | HEIGHT: 61 IN

## 2023-11-09 DIAGNOSIS — G35 MULTIPLE SCLEROSIS (HCC): ICD-10-CM

## 2023-11-09 PROCEDURE — 700111 HCHG RX REV CODE 636 W/ 250 OVERRIDE (IP): Mod: JZ,JG | Performed by: SPECIALIST

## 2023-11-09 PROCEDURE — 700105 HCHG RX REV CODE 258: Performed by: SPECIALIST

## 2023-11-09 PROCEDURE — 96365 THER/PROPH/DIAG IV INF INIT: CPT

## 2023-11-09 RX ORDER — SODIUM CHLORIDE 9 MG/ML
INJECTION, SOLUTION INTRAVENOUS CONTINUOUS
Status: CANCELLED | OUTPATIENT
Start: 2023-11-09

## 2023-11-09 RX ADMIN — NATALIZUMAB 300 MG: 300 INJECTION INTRAVENOUS at 12:00

## 2023-11-09 ASSESSMENT — FIBROSIS 4 INDEX: FIB4 SCORE: 1.08

## 2023-11-09 NOTE — PROGRESS NOTES
PT ambulatory to [facility] for Tysabri.  Pt denies any new or acute changes. TOUCH pre-infusion checklist completed on line.  PIV placed, brisk blood return observed.  Tysabri infused as ordered.  PT tolerated well.  No s/s of adverse reaction observed or expressed.  PIV flushed and removed, gauze and coban to site.  Pt discharged home to self care in [facility], next appointment scheduled.

## 2023-12-21 ENCOUNTER — OUTPATIENT INFUSION SERVICES (OUTPATIENT)
Dept: ONCOLOGY | Facility: MEDICAL CENTER | Age: 63
End: 2023-12-21
Attending: SPECIALIST
Payer: MEDICARE

## 2023-12-21 VITALS
WEIGHT: 208.56 LBS | OXYGEN SATURATION: 97 % | HEART RATE: 107 BPM | HEIGHT: 61 IN | SYSTOLIC BLOOD PRESSURE: 123 MMHG | DIASTOLIC BLOOD PRESSURE: 77 MMHG | BODY MASS INDEX: 39.38 KG/M2 | RESPIRATION RATE: 18 BRPM | TEMPERATURE: 98.4 F

## 2023-12-21 DIAGNOSIS — G35 MULTIPLE SCLEROSIS (HCC): ICD-10-CM

## 2023-12-21 PROCEDURE — 700111 HCHG RX REV CODE 636 W/ 250 OVERRIDE (IP): Mod: JZ,JG | Performed by: SPECIALIST

## 2023-12-21 PROCEDURE — 700105 HCHG RX REV CODE 258: Performed by: SPECIALIST

## 2023-12-21 PROCEDURE — 96365 THER/PROPH/DIAG IV INF INIT: CPT

## 2023-12-21 RX ORDER — SODIUM CHLORIDE 9 MG/ML
INJECTION, SOLUTION INTRAVENOUS CONTINUOUS
Status: CANCELLED | OUTPATIENT
Start: 2024-01-11

## 2023-12-21 RX ADMIN — NATALIZUMAB 300 MG: 300 INJECTION INTRAVENOUS at 09:50

## 2023-12-21 ASSESSMENT — FIBROSIS 4 INDEX: FIB4 SCORE: 1.08

## 2023-12-21 NOTE — PROGRESS NOTES
Pt arrived ambulatory using walker for Q6 week Tysabri, had right knee replacement last month, recovering well.  Pt denies any new MS symptoms, stable at this time.  Touch prescribing checklist completed, okay for infusion.  PIV started with good blood return, Tysabri infused per orders, pt tolerated well, no reaction noted.  Pt scheduled for next 2 appts and given printout of appts, verbalized understanding and will f/u as scheduled.

## 2024-01-17 ENCOUNTER — HOSPITAL ENCOUNTER (OUTPATIENT)
Facility: MEDICAL CENTER | Age: 64
End: 2024-01-18
Attending: EMERGENCY MEDICINE | Admitting: HOSPITALIST
Payer: MEDICARE

## 2024-01-17 ENCOUNTER — APPOINTMENT (OUTPATIENT)
Dept: RADIOLOGY | Facility: MEDICAL CENTER | Age: 64
End: 2024-01-17
Attending: EMERGENCY MEDICINE
Payer: MEDICARE

## 2024-01-17 ENCOUNTER — APPOINTMENT (OUTPATIENT)
Dept: RADIOLOGY | Facility: MEDICAL CENTER | Age: 64
End: 2024-01-17
Payer: MEDICARE

## 2024-01-17 DIAGNOSIS — R07.9 CHEST PAIN, UNSPECIFIED TYPE: ICD-10-CM

## 2024-01-17 PROBLEM — I10 PRIMARY HYPERTENSION: Status: ACTIVE | Noted: 2024-01-17

## 2024-01-17 PROBLEM — R94.31 ABNORMAL EKG: Status: ACTIVE | Noted: 2024-01-17

## 2024-01-17 LAB
ALBUMIN SERPL BCP-MCNC: 4.3 G/DL (ref 3.2–4.9)
ALBUMIN/GLOB SERPL: 1.6 G/DL
ALP SERPL-CCNC: 79 U/L (ref 30–99)
ALT SERPL-CCNC: 9 U/L (ref 2–50)
ANION GAP SERPL CALC-SCNC: 13 MMOL/L (ref 7–16)
AST SERPL-CCNC: 12 U/L (ref 12–45)
BASOPHILS # BLD AUTO: 0.8 % (ref 0–1.8)
BASOPHILS # BLD: 0.06 K/UL (ref 0–0.12)
BILIRUB SERPL-MCNC: 0.2 MG/DL (ref 0.1–1.5)
BUN SERPL-MCNC: 16 MG/DL (ref 8–22)
CALCIUM ALBUM COR SERPL-MCNC: 9.5 MG/DL (ref 8.5–10.5)
CALCIUM SERPL-MCNC: 9.7 MG/DL (ref 8.4–10.2)
CHLORIDE SERPL-SCNC: 103 MMOL/L (ref 96–112)
CO2 SERPL-SCNC: 26 MMOL/L (ref 20–33)
CREAT SERPL-MCNC: 1 MG/DL (ref 0.5–1.4)
EKG IMPRESSION: NORMAL
EKG IMPRESSION: NORMAL
EOSINOPHIL # BLD AUTO: 0.2 K/UL (ref 0–0.51)
EOSINOPHIL NFR BLD: 2.5 % (ref 0–6.9)
ERYTHROCYTE [DISTWIDTH] IN BLOOD BY AUTOMATED COUNT: 45.4 FL (ref 35.9–50)
GFR SERPLBLD CREATININE-BSD FMLA CKD-EPI: 63 ML/MIN/1.73 M 2
GLOBULIN SER CALC-MCNC: 2.7 G/DL (ref 1.9–3.5)
GLUCOSE SERPL-MCNC: 83 MG/DL (ref 65–99)
HCT VFR BLD AUTO: 40.9 % (ref 37–47)
HGB BLD-MCNC: 13.1 G/DL (ref 12–16)
IMM GRANULOCYTES # BLD AUTO: 0.04 K/UL (ref 0–0.11)
IMM GRANULOCYTES NFR BLD AUTO: 0.5 % (ref 0–0.9)
LYMPHOCYTES # BLD AUTO: 2.57 K/UL (ref 1–4.8)
LYMPHOCYTES NFR BLD: 32.6 % (ref 22–41)
MCH RBC QN AUTO: 28.4 PG (ref 27–33)
MCHC RBC AUTO-ENTMCNC: 32 G/DL (ref 32.2–35.5)
MCV RBC AUTO: 88.7 FL (ref 81.4–97.8)
MONOCYTES # BLD AUTO: 0.58 K/UL (ref 0–0.85)
MONOCYTES NFR BLD AUTO: 7.4 % (ref 0–13.4)
NEUTROPHILS # BLD AUTO: 4.43 K/UL (ref 1.82–7.42)
NEUTROPHILS NFR BLD: 56.2 % (ref 44–72)
NRBC # BLD AUTO: 0.02 K/UL
NRBC BLD-RTO: 0.3 /100 WBC (ref 0–0.2)
PLATELET # BLD AUTO: 252 K/UL (ref 164–446)
PMV BLD AUTO: 10.3 FL (ref 9–12.9)
POTASSIUM SERPL-SCNC: 3.7 MMOL/L (ref 3.6–5.5)
PROT SERPL-MCNC: 7 G/DL (ref 6–8.2)
RBC # BLD AUTO: 4.61 M/UL (ref 4.2–5.4)
SODIUM SERPL-SCNC: 142 MMOL/L (ref 135–145)
TROPONIN T SERPL-MCNC: 10 NG/L (ref 6–19)
TROPONIN T SERPL-MCNC: 11 NG/L (ref 6–19)
WBC # BLD AUTO: 7.9 K/UL (ref 4.8–10.8)

## 2024-01-17 PROCEDURE — 84484 ASSAY OF TROPONIN QUANT: CPT

## 2024-01-17 PROCEDURE — G0378 HOSPITAL OBSERVATION PER HR: HCPCS

## 2024-01-17 PROCEDURE — 93005 ELECTROCARDIOGRAM TRACING: CPT

## 2024-01-17 PROCEDURE — 36415 COLL VENOUS BLD VENIPUNCTURE: CPT

## 2024-01-17 PROCEDURE — 99223 1ST HOSP IP/OBS HIGH 75: CPT | Performed by: HOSPITALIST

## 2024-01-17 PROCEDURE — 700102 HCHG RX REV CODE 250 W/ 637 OVERRIDE(OP): Performed by: HOSPITALIST

## 2024-01-17 PROCEDURE — 71045 X-RAY EXAM CHEST 1 VIEW: CPT

## 2024-01-17 PROCEDURE — 94760 N-INVAS EAR/PLS OXIMETRY 1: CPT

## 2024-01-17 PROCEDURE — 700102 HCHG RX REV CODE 250 W/ 637 OVERRIDE(OP): Performed by: EMERGENCY MEDICINE

## 2024-01-17 PROCEDURE — A9270 NON-COVERED ITEM OR SERVICE: HCPCS | Performed by: EMERGENCY MEDICINE

## 2024-01-17 PROCEDURE — 80053 COMPREHEN METABOLIC PANEL: CPT

## 2024-01-17 PROCEDURE — A9270 NON-COVERED ITEM OR SERVICE: HCPCS | Performed by: HOSPITALIST

## 2024-01-17 PROCEDURE — 85025 COMPLETE CBC W/AUTO DIFF WBC: CPT

## 2024-01-17 PROCEDURE — 99285 EMERGENCY DEPT VISIT HI MDM: CPT

## 2024-01-17 PROCEDURE — 93005 ELECTROCARDIOGRAM TRACING: CPT | Performed by: EMERGENCY MEDICINE

## 2024-01-17 RX ORDER — LISINOPRIL 20 MG/1
40 TABLET ORAL DAILY
Status: DISCONTINUED | OUTPATIENT
Start: 2024-01-18 | End: 2024-01-18 | Stop reason: HOSPADM

## 2024-01-17 RX ORDER — FLUTICASONE FUROATE AND VILANTEROL 200; 25 UG/1; UG/1
1 POWDER RESPIRATORY (INHALATION) DAILY
Status: DISCONTINUED | OUTPATIENT
Start: 2024-01-17 | End: 2024-01-17

## 2024-01-17 RX ORDER — GABAPENTIN 300 MG/1
300 CAPSULE ORAL 2 TIMES DAILY
Status: DISCONTINUED | OUTPATIENT
Start: 2024-01-17 | End: 2024-01-18 | Stop reason: HOSPADM

## 2024-01-17 RX ORDER — ALUMINA, MAGNESIA, AND SIMETHICONE 2400; 2400; 240 MG/30ML; MG/30ML; MG/30ML
30 SUSPENSION ORAL EVERY 4 HOURS PRN
Status: ACTIVE | OUTPATIENT
Start: 2024-01-17 | End: 2024-01-18

## 2024-01-17 RX ORDER — HYDROCODONE BITARTRATE AND ACETAMINOPHEN 5; 325 MG/1; MG/1
1 TABLET ORAL EVERY 6 HOURS PRN
COMMUNITY

## 2024-01-17 RX ORDER — POLYETHYLENE GLYCOL 3350 17 G/17G
1 POWDER, FOR SOLUTION ORAL
Status: DISCONTINUED | OUTPATIENT
Start: 2024-01-17 | End: 2024-01-18 | Stop reason: HOSPADM

## 2024-01-17 RX ORDER — ACETAMINOPHEN 500 MG
1000 TABLET ORAL EVERY 6 HOURS PRN
COMMUNITY

## 2024-01-17 RX ORDER — AMOXICILLIN 250 MG
2 CAPSULE ORAL 2 TIMES DAILY
Status: DISCONTINUED | OUTPATIENT
Start: 2024-01-17 | End: 2024-01-18 | Stop reason: HOSPADM

## 2024-01-17 RX ORDER — BISACODYL 10 MG
10 SUPPOSITORY, RECTAL RECTAL
Status: DISCONTINUED | OUTPATIENT
Start: 2024-01-17 | End: 2024-01-18 | Stop reason: HOSPADM

## 2024-01-17 RX ORDER — NITROGLYCERIN 0.4 MG/1
0.4 TABLET SUBLINGUAL
Status: DISCONTINUED | OUTPATIENT
Start: 2024-01-17 | End: 2024-01-18 | Stop reason: HOSPADM

## 2024-01-17 RX ORDER — ACETAMINOPHEN 325 MG/1
650 TABLET ORAL EVERY 6 HOURS PRN
Status: DISCONTINUED | OUTPATIENT
Start: 2024-01-17 | End: 2024-01-18 | Stop reason: HOSPADM

## 2024-01-17 RX ORDER — BUPROPION HYDROCHLORIDE 150 MG/1
150 TABLET, EXTENDED RELEASE ORAL DAILY
Status: DISCONTINUED | OUTPATIENT
Start: 2024-01-18 | End: 2024-01-18 | Stop reason: HOSPADM

## 2024-01-17 RX ORDER — ASPIRIN 325 MG
325 TABLET ORAL ONCE
Status: COMPLETED | OUTPATIENT
Start: 2024-01-17 | End: 2024-01-17

## 2024-01-17 RX ORDER — TRIAMTERENE AND HYDROCHLOROTHIAZIDE 37.5; 25 MG/1; MG/1
1 TABLET ORAL DAILY
Status: DISCONTINUED | OUTPATIENT
Start: 2024-01-17 | End: 2024-01-18 | Stop reason: HOSPADM

## 2024-01-17 RX ORDER — ALBUTEROL SULFATE 90 UG/1
2 AEROSOL, METERED RESPIRATORY (INHALATION)
Status: DISCONTINUED | OUTPATIENT
Start: 2024-01-17 | End: 2024-01-18 | Stop reason: HOSPADM

## 2024-01-17 RX ORDER — GABAPENTIN 300 MG/1
300 CAPSULE ORAL 2 TIMES DAILY
COMMUNITY

## 2024-01-17 RX ORDER — HYDROCODONE BITARTRATE AND ACETAMINOPHEN 5; 325 MG/1; MG/1
1 TABLET ORAL EVERY 6 HOURS PRN
Status: DISCONTINUED | OUTPATIENT
Start: 2024-01-17 | End: 2024-01-18 | Stop reason: HOSPADM

## 2024-01-17 RX ORDER — ASPIRIN 81 MG/1
81 TABLET ORAL DAILY
Status: DISCONTINUED | OUTPATIENT
Start: 2024-01-18 | End: 2024-01-18 | Stop reason: HOSPADM

## 2024-01-17 RX ADMIN — ASPIRIN 325 MG: 325 TABLET ORAL at 13:21

## 2024-01-17 RX ADMIN — GABAPENTIN 300 MG: 300 CAPSULE ORAL at 18:51

## 2024-01-17 ASSESSMENT — PATIENT HEALTH QUESTIONNAIRE - PHQ9
3. TROUBLE FALLING OR STAYING ASLEEP OR SLEEPING TOO MUCH: NEARLY EVERY DAY
2. FEELING DOWN, DEPRESSED, IRRITABLE, OR HOPELESS: SEVERAL DAYS
6. FEELING BAD ABOUT YOURSELF - OR THAT YOU ARE A FAILURE OR HAVE LET YOURSELF OR YOUR FAMILY DOWN: NOT AL ALL
9. THOUGHTS THAT YOU WOULD BE BETTER OFF DEAD, OR OF HURTING YOURSELF: NOT AT ALL
5. POOR APPETITE OR OVEREATING: MORE THAN HALF THE DAYS
4. FEELING TIRED OR HAVING LITTLE ENERGY: NEARLY EVERY DAY
SUM OF ALL RESPONSES TO PHQ QUESTIONS 1-9: 16
8. MOVING OR SPEAKING SO SLOWLY THAT OTHER PEOPLE COULD HAVE NOTICED. OR THE OPPOSITE, BEING SO FIGETY OR RESTLESS THAT YOU HAVE BEEN MOVING AROUND A LOT MORE THAN USUAL: NEARLY EVERY DAY
1. LITTLE INTEREST OR PLEASURE IN DOING THINGS: SEVERAL DAYS
SUM OF ALL RESPONSES TO PHQ9 QUESTIONS 1 AND 2: 2
7. TROUBLE CONCENTRATING ON THINGS, SUCH AS READING THE NEWSPAPER OR WATCHING TELEVISION: NEARLY EVERY DAY

## 2024-01-17 ASSESSMENT — COGNITIVE AND FUNCTIONAL STATUS - GENERAL
DAILY ACTIVITIY SCORE: 24
SUGGESTED CMS G CODE MODIFIER DAILY ACTIVITY: CH
CLIMB 3 TO 5 STEPS WITH RAILING: A LITTLE
WALKING IN HOSPITAL ROOM: A LITTLE
MOBILITY SCORE: 22
SUGGESTED CMS G CODE MODIFIER MOBILITY: CJ

## 2024-01-17 ASSESSMENT — ENCOUNTER SYMPTOMS
SHORTNESS OF BREATH: 0
ABDOMINAL PAIN: 0
FEVER: 0
CHILLS: 0
COUGH: 0
STRIDOR: 0
EYE DISCHARGE: 0
EYE REDNESS: 0
VOMITING: 0
FLANK PAIN: 0
NERVOUS/ANXIOUS: 0
BRUISES/BLEEDS EASILY: 0
FOCAL WEAKNESS: 0
MYALGIAS: 0

## 2024-01-17 ASSESSMENT — HEART SCORE
HEART SCORE: 4
HISTORY: HIGHLY SUSPICIOUS
RISK FACTORS: 1-2 RISK FACTORS
ECG: NORMAL
TROPONIN: LESS THAN OR EQUAL TO NORMAL LIMIT
AGE: 45-64

## 2024-01-17 ASSESSMENT — LIFESTYLE VARIABLES
TOTAL SCORE: 0
ON A TYPICAL DAY WHEN YOU DRINK ALCOHOL HOW MANY DRINKS DO YOU HAVE: 2
HAVE PEOPLE ANNOYED YOU BY CRITICIZING YOUR DRINKING: NO
HOW MANY TIMES IN THE PAST YEAR HAVE YOU HAD 5 OR MORE DRINKS IN A DAY: 0
CONSUMPTION TOTAL: NEGATIVE
AVERAGE NUMBER OF DAYS PER WEEK YOU HAVE A DRINK CONTAINING ALCOHOL: 0
EVER FELT BAD OR GUILTY ABOUT YOUR DRINKING: NO
EVER HAD A DRINK FIRST THING IN THE MORNING TO STEADY YOUR NERVES TO GET RID OF A HANGOVER: NO
TOTAL SCORE: 0
ALCOHOL_USE: YES
HAVE YOU EVER FELT YOU SHOULD CUT DOWN ON YOUR DRINKING: NO
TOTAL SCORE: 0

## 2024-01-17 ASSESSMENT — PAIN DESCRIPTION - PAIN TYPE: TYPE: ACUTE PAIN

## 2024-01-17 ASSESSMENT — FIBROSIS 4 INDEX
FIB4 SCORE: 1.015873015873015873
FIB4 SCORE: 1.08

## 2024-01-17 NOTE — ED TRIAGE NOTES
Pt ambulates to triage with spouse  Chief Complaint   Patient presents with    Chest Pain    Jaw Pain     Pain started in jaw 15 minutes ago and goes down into chest, describes as burning     Pt back for EKG

## 2024-01-17 NOTE — ED NOTES
Medication history reviewed with pt. Med rec is complete.  Allergies reviewed, per pt  Interviewed pt with  at bedside with permission from pt.    Pt reports that she stopped taking her ESTRADIOL/NORETHINDRONE 0.5-0.1MG on 11/30/2023    Patient has not had any outpatient antibiotics in the last 30 days.    Pt is not on any anticoagulants

## 2024-01-17 NOTE — ASSESSMENT & PLAN NOTE
EKG shows sinus rhythm with a rate of 76, there is ST depression in lead III and aVF.  QTc is 412.     I will place on continuous cardiac monitoring  I will check and trend troponins  I will check echocardiography

## 2024-01-17 NOTE — H&P
[facility] Medicine History & Physical Note    Date of Service  1/17/2024    Primary Care Physician  Molly Hurd M.D.     Consultants  None      Code Status  Full Code    Chief Complaint  Chief Complaint   Patient presents with    Chest Pain    Jaw Pain     Pain started in jaw 15 minutes ago and goes down into chest, describes as burning     History of Presenting Illness  Radha Smith is a 63 y.o. female with a past medical history of primary hypertension, elevated BMI of 38, anxiety who presented 1/17/2024 with chest pain.  Symptoms started suddenly while driving.  The pain is retrosternal location described as having a burning sensation.  The pain also radiates to the jaws and the left arm/shoulder.  Patient denies having nausea vomiting fevers chills or diaphoresis.      I discussed the plan of care with emergency department physician, and the patient    Review of Systems  Review of Systems   Constitutional:  Negative for chills and fever.   HENT:          Jaw pain   Eyes:  Negative for discharge and redness.   Respiratory:  Negative for cough, shortness of breath and stridor.    Cardiovascular:  Positive for chest pain. Negative for leg swelling.   Gastrointestinal:  Negative for abdominal pain and vomiting.   Genitourinary:  Negative for flank pain.   Musculoskeletal:  Negative for myalgias.   Skin: Negative.    Neurological:  Negative for focal weakness.   Endo/Heme/Allergies:  Does not bruise/bleed easily.   Psychiatric/Behavioral:  The patient is not nervous/anxious.      Past Medical History   has a past medical history of Asthma, Bronchitis, Chest pain (1/17/2024), Depression, Heart burn, High cholesterol, Hypertension, Infectious disease, Multiple sclerosis (HCC), PONV (postoperative nausea and vomiting), Renal disorder, and Snoring.    Surgical History   has a past surgical history that includes cholecystectomy; knee arthroscopy (Right); primary c section; and knee arthroscopy (Left,  6/7/2023).     Family History  family history includes Bipolar disorder in her son; Cancer in her mother; Diabetes in her mother; Hypertension in her brother; Other in her father.      Social History   reports that she has been smoking cigarettes. She has never used smokeless tobacco. She reports current alcohol use of about 3.0 oz of alcohol per week. She reports that she does not use drugs.    Allergies  No Known Allergies    Medications  Prior to Admission Medications   Prescriptions Last Dose Informant Patient Reported? Taking?   Ascorbic Acid (VITAMIN C PO) 1/17/2024 at 0900 Patient Yes Yes   Sig: Take 1 Tablet by mouth every day.   BREO ELLIPTA 200-25 MCG/INH AEROSOL POWDER, BREATH ACTIVATED > 2 days at Unknown Patient Yes No   Sig: Inhale 1 Puff every day. Indications: Asthma   Cholecalciferol (D3 PO) 1/16/2024 at 2100 Patient Yes Yes   Sig: Take 1 Capsule by mouth every evening.   HYDROcodone-acetaminophen (NORCO) 5-325 MG Tab per tablet 1/15/2024 at PM Patient Yes Yes   Sig: Take 1 Tablet by mouth every 6 hours as needed. Indications: Pain   Multiple Vitamin (MULTI-VITAMINS) Tab 1/17/2024 at 0900 Patient Yes No   Sig: Take 1 Tablet by mouth every day.   VENTOLIN  (90 Base) MCG/ACT Aero Soln inhalation aerosol > 2 days at Unknown Patient Yes No   Sig: Inhale 2 Puffs every four hours as needed for Shortness of Breath. Indications: Asthma   acetaminophen (TYLENOL) 500 MG Tab 1/17/2024 at 0900 Patient Yes Yes   Sig: Take 1,000 mg by mouth every 6 hours as needed. Indications: Pain   buPROPion SR (WELLBUTRIN-SR) 150 MG TABLET SR 12 HR sustained-release tablet 1/17/2024 at 0900 Patient Yes No   Sig: Take 150 mg by mouth every day. Pt takes only once a day, not BID   gabapentin (NEURONTIN) 300 MG Cap 1/17/2024 at 0900 Patient Yes Yes   Sig: Take 300 mg by mouth 2 times a day.   lisinopril (PRINIVIL) 40 MG tablet 1/17/2024 at 0900 Patient Yes No   Sig: Take 40 mg by mouth every day.   natalizumab (TYSABRI)  300 MG/15ML Conc 12/21/2023 at 0945 Patient Yes No   Sig: Infuse  into a venous catheter see administration instructions. Pt receives every 6 weeks, last dose taken on 12/21/2023   sertraline (ZOLOFT) 100 MG Tab 1/17/2024 at 0900 Patient Yes No   Sig: Take 100 mg by mouth every day.   triamterene/hctz (MAXZIDE-25/DYAZIDE) 37.5-25 MG Cap 1/16/2024 at 0900 Patient Yes No   Sig: Take 1 Capsule by mouth every day.      Facility-Administered Medications: None     Physical Exam  Temp:  [36.7 °C (98.1 °F)] 36.7 °C (98.1 °F)  Pulse:  [68-90] 87  Resp:  [16] 16  BP: (119-163)/(62-95) 123/75  SpO2:  [94 %-100 %] 94 %  Blood Pressure: (!) 152/68   Temperature: 36.7 °C (98.1 °F)   Pulse: 73   Respiration: 16   Pulse Oximetry: 100 %     Physical Exam  Constitutional:       General: She is not in acute distress.  HENT:      Head: Normocephalic and atraumatic.      Right Ear: External ear normal.      Left Ear: External ear normal.      Nose: No congestion or rhinorrhea.      Mouth/Throat:      Mouth: Mucous membranes are moist.      Pharynx: No oropharyngeal exudate or posterior oropharyngeal erythema.   Eyes:      General: No scleral icterus.        Right eye: No discharge.         Left eye: No discharge.      Conjunctiva/sclera: Conjunctivae normal.      Pupils: Pupils are equal, round, and reactive to light.   Cardiovascular:      Rate and Rhythm: Normal rate and regular rhythm.      Heart sounds:      No friction rub. No gallop.   Pulmonary:      Effort: Pulmonary effort is normal.   Abdominal:      General: Abdomen is flat. There is no distension.      Tenderness: There is no guarding.   Musculoskeletal:         General: No swelling.      Cervical back: Neck supple. No rigidity. No muscular tenderness.      Right lower leg: No edema.      Left lower leg: No edema.   Skin:     Capillary Refill: Capillary refill takes 2 to 3 seconds.      Coloration: Skin is not jaundiced or pale.      Findings: No bruising or erythema.  "  Neurological:      Mental Status: She is alert and oriented to person, place, and time.   Psychiatric:         Mood and Affect: Mood normal.         Judgment: Judgment normal.       Laboratory:  Recent Labs     01/17/24  1328   WBC 7.9   RBC 4.61   HEMOGLOBIN 13.1   HEMATOCRIT 40.9   MCV 88.7   MCH 28.4   MCHC 32.0*   RDW 45.4   PLATELETCT 252   MPV 10.3     Recent Labs     01/17/24  1328   SODIUM 142   POTASSIUM 3.7   CHLORIDE 103   CO2 26   GLUCOSE 83   BUN 16   CREATININE 1.00   CALCIUM 9.7     Recent Labs     01/17/24  1328   ALTSGPT 9   ASTSGOT 12   ALKPHOSPHAT 79   TBILIRUBIN 0.2   GLUCOSE 83         No results for input(s): \"NTPROBNP\" in the last 72 hours.      Recent Labs     01/17/24  1328   TROPONINT 10     Imaging:  DX-CHEST-PORTABLE (1 VIEW)   Final Result         1. No acute cardiopulmonary abnormalities are identified.      EC-ECHOCARDIOGRAM COMPLETE W/O CONT    (Results Pending)     I personally reviewed patient EKG shows sinus rhythm with a rate of 76, there is ST depression in lead III and aVF.  QTc is 412.    Assessment/Plan:  Justification for Admission Status  I anticipate this patient is appropriate for observation status at this time because likely discharge after 1 midnight.  The patient has chest pain.    Patient will need a Telemetry bed on MEDICAL service.  The patient has chest pain.     * Chest pain- (present on admission)  Assessment & Plan  EKG shows sinus rhythm with a rate of 76, there is ST depression in lead III and aVF.  QTc is 412.  Initial troponin is within normal limits, I will trend  I ordered Stat EKG and troponin for recurrence of chest pain.   I will place on continuous cardiac monitoring.  Plan for stress test in the morning [ordered] as long as there are no significant EKG changes or significant troponin elevation     Anxiety- (present on admission)  Assessment & Plan  I will start bupropion, and sertraline    Abnormal EKG- (present on admission)  Assessment & Plan  EKG " shows sinus rhythm with a rate of 76, there is ST depression in lead III and aVF.  QTc is 412.     I will place on continuous cardiac monitoring  I will check and trend troponins  I will check echocardiography       Primary hypertension- (present on admission)  Assessment & Plan  I will start lisinopril and hydrochlorothiazide with hold parameters    Neuropathy- (present on admission)  Assessment & Plan  I will start gabapentin      Multiple sclerosis (HCC)- (present on admission)  Assessment & Plan  Not currently in exacerbation  The patient is on natalizumab injections every 6 weeks       VTE prophylaxis: SCDs/TEDs and Xarelto 10 mg daily as prophylaxis

## 2024-01-17 NOTE — ED PROVIDER NOTES
CHIEF COMPLAINT  Chief Complaint   Patient presents with    Chest Pain    Jaw Pain     Pain started in jaw 15 minutes ago and goes down into chest, describes as burning       LIMITATION TO HISTORY   Select: none    HPI    Radha Smith is a 63 y.o. female who presents to the Emergency Department complaining of chest pain jaw pain.  The patient was driving approximately 30 minutes prior to arrival as a passenger when she had acute onset of a burning sensation and pressure up in her jaw and then went down into her chest and then down the left arm.  The patient was very concerned about the symptoms so they immediately diverted and came to the emerged part for evaluation.  Upon arrival here she stated that even it while she was in the parking lot she was still having the discomfort denies any overt shortness of breath nausea or diaphoresis but just describes significant discomfort.  The patient then was brought into the emergency department EKG was done and upon my evaluation she states that the symptoms have gone away spontaneously and currently she is asymptomatic.  Patient does have a history of multiple sclerosis currently is under control she has no overt weakness or other symptoms.  She denies any current fevers chills nausea vomiting denies any current chest pain.    OUTSIDE HISTORIAN(S):  Select: None    EXTERNAL RECORDS REVIEWED  Select: Other had an echocardiogram done 1/25/2023 with an ejection fraction of 63%      PAST MEDICAL HISTORY  Past Medical History:   Diagnosis Date    Asthma     Bronchitis     Chest pain 1/17/2024    Depression     Heart burn     High cholesterol     slightly elevated    Hypertension     Infectious disease     covid 2022    Multiple sclerosis (HCC)     PONV (postoperative nausea and vomiting)     Renal disorder     ckd    Snoring      .    SURGICAL HISTORY  Past Surgical History:   Procedure Laterality Date    KNEE ARTHROSCOPY Left 6/7/2023    Procedure: LEFT KNEE ARTHROSCOPY,  PARTIAL MENISCECTOMY, CHONDROPLASTY;  Surgeon: Quique Ghotra M.D.;  Location: SURGERY [facility];  Service: Orthopedics    CHOLECYSTECTOMY      KNEE ARTHROSCOPY Right     PRIMARY C SECTION           FAMILY HISTORY  Family History   Problem Relation Age of Onset    Cancer Mother         Breast    Diabetes Mother     Other Father         Gout    Hypertension Brother     Bipolar disorder Son           SOCIAL HISTORY  Social History     Socioeconomic History    Marital status:      Spouse name: Not on file    Number of children: Not on file    Years of education: Not on file    Highest education level: Not on file   Occupational History    Not on file   Tobacco Use    Smoking status: Some Days     Types: Cigarettes    Smokeless tobacco: Never    Tobacco comments:     Previously smoked , only smokes when gambling, not for last 2 weeks   Vaping Use    Vaping Use: Never used   Substance and Sexual Activity    Alcohol use: Yes     Alcohol/week: 3.0 oz     Types: 5 Glasses of wine per week    Drug use: No    Sexual activity: Not on file   Other Topics Concern    Not on file   Social History Narrative    Not on file     Social Determinants of Health     Financial Resource Strain: Not on file   Food Insecurity: Not on file   Transportation Needs: Not on file   Physical Activity: Not on file   Stress: Not on file   Social Connections: Not on file   Intimate Partner Violence: Not on file   Housing Stability: Not on file         CURRENT MEDICATIONS  No current facility-administered medications on file prior to encounter.     Current Outpatient Medications on File Prior to Encounter   Medication Sig Dispense Refill    Ascorbic Acid (VITAMIN C PO) Take 1 Tablet by mouth every day.      acetaminophen (TYLENOL) 500 MG Tab Take 1,000 mg by mouth every 6 hours as needed. Indications: Pain      Cholecalciferol (D3 PO) Take 1 Capsule by mouth every evening.      gabapentin (NEURONTIN) 300 MG Cap Take 300 mg by mouth 2  "times a day.      HYDROcodone-acetaminophen (NORCO) 5-325 MG Tab per tablet Take 1 Tablet by mouth every 6 hours as needed. Indications: Pain      buPROPion SR (WELLBUTRIN-SR) 150 MG TABLET SR 12 HR sustained-release tablet Take 150 mg by mouth every day. Pt takes only once a day, not BID      lisinopril (PRINIVIL) 40 MG tablet Take 40 mg by mouth every day.      BREO ELLIPTA 200-25 MCG/INH AEROSOL POWDER, BREATH ACTIVATED Inhale 1 Puff every day. Indications: Asthma      VENTOLIN  (90 Base) MCG/ACT Aero Soln inhalation aerosol Inhale 2 Puffs every four hours as needed for Shortness of Breath. Indications: Asthma      triamterene/hctz (MAXZIDE-25/DYAZIDE) 37.5-25 MG Cap Take 1 Capsule by mouth every day.      Multiple Vitamin (MULTI-VITAMINS) Tab Take 1 Tablet by mouth every day.      natalizumab (TYSABRI) 300 MG/15ML Conc Infuse  into a venous catheter see administration instructions. Pt receives every 6 weeks, last dose taken on 12/21/2023      sertraline (ZOLOFT) 100 MG Tab Take 100 mg by mouth every day.             ALLERGIES  No Known Allergies    PHYSICAL EXAM  VITAL SIGNS:/62   Pulse 87   Temp 36.7 °C (98.1 °F) (Temporal)   Resp 16   Ht 1.549 m (5' 1\")   Wt 93.6 kg (206 lb 5.6 oz)   SpO2 96%   BMI 38.99 kg/m²     Constitutional:  Well-developed no acute distress   HENT: Normocephalic, Atraumatic, Bilateral external ears normal.  Eyes:  conjunctiva are normal.   Neck: Supple.  Nontender midline  Cardiovascular: Regular rate and rhythm without murmurs gallops or rubs.   Thorax & Lungs: No respiratory distress. Breathing comfortably. Lungs are clear to auscultation bilaterally, there are no wheezes no rales. Chest wall is nontender.  Abdomen: Soft, non distended, non tender   Skin: Warm, Dry, No erythema,   Back: No tenderness, No CVA tenderness.  Musculoskeletal: No clubbing cyanosis or edema good range of motion   Neurologic: Alert & oriented x 3, normal sensation moving all extremities " appears normal   Psychiatric: Affect normal, Judgment normal, Mood normal.       DIAGNOSTIC STUDIES / PROCEDURES      LABS  Results for orders placed or performed during the hospital encounter of 01/17/24   CBC with Differential   Result Value Ref Range    WBC 7.9 4.8 - 10.8 K/uL    RBC 4.61 4.20 - 5.40 M/uL    Hemoglobin 13.1 12.0 - 16.0 g/dL    Hematocrit 40.9 37.0 - 47.0 %    MCV 88.7 81.4 - 97.8 fL    MCH 28.4 27.0 - 33.0 pg    MCHC 32.0 (L) 32.2 - 35.5 g/dL    RDW 45.4 35.9 - 50.0 fL    Platelet Count 252 164 - 446 K/uL    MPV 10.3 9.0 - 12.9 fL    Neutrophils-Polys 56.20 44.00 - 72.00 %    Lymphocytes 32.60 22.00 - 41.00 %    Monocytes 7.40 0.00 - 13.40 %    Eosinophils 2.50 0.00 - 6.90 %    Basophils 0.80 0.00 - 1.80 %    Immature Granulocytes 0.50 0.00 - 0.90 %    Nucleated RBC 0.30 (H) 0.00 - 0.20 /100 WBC    Neutrophils (Absolute) 4.43 1.82 - 7.42 K/uL    Lymphs (Absolute) 2.57 1.00 - 4.80 K/uL    Monos (Absolute) 0.58 0.00 - 0.85 K/uL    Eos (Absolute) 0.20 0.00 - 0.51 K/uL    Baso (Absolute) 0.06 0.00 - 0.12 K/uL    Immature Granulocytes (abs) 0.04 0.00 - 0.11 K/uL    NRBC (Absolute) 0.02 K/uL   Complete Metabolic Panel (CMP)   Result Value Ref Range    Sodium 142 135 - 145 mmol/L    Potassium 3.7 3.6 - 5.5 mmol/L    Chloride 103 96 - 112 mmol/L    Co2 26 20 - 33 mmol/L    Anion Gap 13.0 7.0 - 16.0    Glucose 83 65 - 99 mg/dL    Bun 16 8 - 22 mg/dL    Creatinine 1.00 0.50 - 1.40 mg/dL    Calcium 9.7 8.4 - 10.2 mg/dL    Correct Calcium 9.5 8.5 - 10.5 mg/dL    AST(SGOT) 12 12 - 45 U/L    ALT(SGPT) 9 2 - 50 U/L    Alkaline Phosphatase 79 30 - 99 U/L    Total Bilirubin 0.2 0.1 - 1.5 mg/dL    Albumin 4.3 3.2 - 4.9 g/dL    Total Protein 7.0 6.0 - 8.2 g/dL    Globulin 2.7 1.9 - 3.5 g/dL    A-G Ratio 1.6 g/dL   Troponins NOW   Result Value Ref Range    Troponin T 10 6 - 19 ng/L   ESTIMATED GFR   Result Value Ref Range    GFR (CKD-EPI) 63 >60 mL/min/1.73 m 2   EKG   Result Value Ref Range    Report       [facility] Emergency Dept.    Test Date:  2024  Pt Name:    SHERRY TRAVIS                Department: [facility]  MRN:        [id number]                      Room:  Gender:     Female                       Technician: [id number]  :        1960                   Requested By:ER TRIAGE PROTOCOL  Order #:    [id number]                    Reading MD: LEX CONTRERAS MD    Measurements  Intervals                                Axis  Rate:       67                           P:          71  OK:         148                          QRS:        62  QRSD:       104                          T:          41  QT:         390  QTc:        412    Interpretive Statements  Sinus rhythm  Low voltage, precordial leads  No previous ECG available for comparison  otherwise normal ECG  Electronically Signed On 2024 12:59:14 PST by LEX CONTRERAS MD     EKG   Result Value Ref Range    Report       [facility] Emergency Dept.    Test Date:  2024  Pt Name:    SHERRY TRAVIS                Department: [facility]  MRN:        [id number]                      [facility]  Gender:     Female                       Technician: [id number]  :        1960                   Requested By:ER TRIAGE PROTOCOL  Order #:    [id number]                    Reading MD:    Measurements  Intervals                                Axis  Rate:       74                           P:          38  OK:         162                          QRS:        7  QRSD:       103                          T:          5  QT:         366  QTc:        406    Interpretive Statements  Sinus rhythm  Low voltage, precordial leads  Compared to ECG 2024 12:48:40  No significant changes         EKG:   I have independently interpreted this EKG as detailed above.       RADIOLOGY  I have independently interpreted the diagnostic imaging associated with this visit and am waiting the final reading from the radiologist.   My preliminary  interpretation is as follows: No acute infiltrates      Radiologist interpretation:   DX-CHEST-PORTABLE (1 VIEW)   Final Result         1. No acute cardiopulmonary abnormalities are identified.      EC-ECHOCARDIOGRAM COMPLETE W/O CONT    (Results Pending)        COURSE & MEDICAL DECISION MAKING    ED COURSE:    ED Observation Status? No, the patient does not qualify for observation    INTERVENTIONS BY ME:  Medications   fluticasone furoate-vilanterol (Breo) 200-25 MCG/ACT inhaler 1 Puff (has no administration in time range)   buPROPion SR (Wellbutrin-SR) tablet 150 mg (has no administration in time range)   gabapentin (Neurontin) capsule 300 mg (has no administration in time range)   HYDROcodone-acetaminophen (Norco) 5-325 MG per tablet 1 Tablet (has no administration in time range)   lisinopril (Prinivil) TABS 40 mg (has no administration in time range)   sertraline (Zoloft) tablet 100 mg (has no administration in time range)   triamterene/hctz (Maxzide-25/Dyazide) 37.5-25 MG 1 Capsule (has no administration in time range)   albuterol inhaler 2 Puff (has no administration in time range)   acetaminophen (Tylenol) tablet 650 mg (has no administration in time range)   senna-docusate (Pericolace Or Senokot S) 8.6-50 MG per tablet 2 Tablet (has no administration in time range)     And   polyethylene glycol/lytes (Miralax) Packet 1 Packet (has no administration in time range)     And   magnesium hydroxide (Milk Of Magnesia) suspension 30 mL (has no administration in time range)     And   bisacodyl (Dulcolax) suppository 10 mg (has no administration in time range)   aspirin EC tablet 81 mg (has no administration in time range)   rivaroxaban (Xarelto) tablet 10 mg (has no administration in time range)   nitroglycerin (Nitrostat) tablet 0.4 mg (has no administration in time range)   mag hydrox-al hydrox-simeth (Maalox Plus Es Or Mylanta Ds) suspension 30 mL (has no administration in time range)   aspirin (Asa) tablet 325 mg  (325 mg Oral Given 1/17/24 1321)         Rechecks upon recheck the patient started having chest pain again repeat EKG shows no changes.      INITIAL ASSESSMENT, COURSE AND PLAN  Care Narrative: Patient presents emerged part for evaluation.  Based on the patient's symptomatology and repeat chest discomfort 2 EKGs were done there is no signs of acute ST elevations.  Troponin is normal.  The patient's heart score is 4.  At this point based on the patient's symptomatology and concern for acute coronary syndrome I do feel the patient should be admitted the hospital for further risk stratification.  I spoken to the hospitalist for this at admit.       ADDITIONAL PROBLEM LIST  History of multiple sclerosis  DISPOSITION AND DISCUSSIONS  Patient will be admitted for further treatment and care    FINAL DIAGNOSIS  1. Chest pain, unspecified type        Electronically signed by: Cruz Rosenthal M.D.,3:47 PM 01/17/24

## 2024-01-17 NOTE — ED NOTES
ERP to re eval Pt reports return of vague jaw discomfort but denies CP  Repeat EKG done Pt now for admission

## 2024-01-17 NOTE — ASSESSMENT & PLAN NOTE
EKG shows sinus rhythm with a rate of 76, there is ST depression in lead III and aVF.  QTc is 412.  Initial troponin is within normal limits, I will trend  I ordered Stat EKG and troponin for recurrence of chest pain.   I will place on continuous cardiac monitoring.  Plan for stress test in the morning [ordered] as long as there are no significant EKG changes or significant troponin elevation

## 2024-01-17 NOTE — ED NOTES
1445 S/S resolved VSS Pain free NSR on monitor Pt advised to let nursing staff know if pain returns

## 2024-01-18 ENCOUNTER — APPOINTMENT (OUTPATIENT)
Dept: CARDIOLOGY | Facility: MEDICAL CENTER | Age: 64
End: 2024-01-18
Attending: HOSPITALIST
Payer: MEDICARE

## 2024-01-18 ENCOUNTER — APPOINTMENT (OUTPATIENT)
Dept: RADIOLOGY | Facility: MEDICAL CENTER | Age: 64
End: 2024-01-18
Attending: HOSPITALIST
Payer: MEDICARE

## 2024-01-18 VITALS
HEIGHT: 61 IN | WEIGHT: 210.32 LBS | BODY MASS INDEX: 39.71 KG/M2 | SYSTOLIC BLOOD PRESSURE: 138 MMHG | TEMPERATURE: 98.1 F | OXYGEN SATURATION: 95 % | RESPIRATION RATE: 16 BRPM | DIASTOLIC BLOOD PRESSURE: 68 MMHG | HEART RATE: 85 BPM

## 2024-01-18 PROBLEM — R07.9 CHEST PAIN: Status: RESOLVED | Noted: 2024-01-17 | Resolved: 2024-01-18

## 2024-01-18 LAB
ALBUMIN SERPL BCP-MCNC: 3.8 G/DL (ref 3.2–4.9)
ALBUMIN/GLOB SERPL: 1.7 G/DL
ALP SERPL-CCNC: 69 U/L (ref 30–99)
ALT SERPL-CCNC: 8 U/L (ref 2–50)
ANION GAP SERPL CALC-SCNC: 10 MMOL/L (ref 7–16)
AST SERPL-CCNC: 9 U/L (ref 12–45)
BILIRUB SERPL-MCNC: 0.2 MG/DL (ref 0.1–1.5)
BUN SERPL-MCNC: 15 MG/DL (ref 8–22)
CALCIUM ALBUM COR SERPL-MCNC: 9.3 MG/DL (ref 8.5–10.5)
CALCIUM SERPL-MCNC: 9.1 MG/DL (ref 8.4–10.2)
CHLORIDE SERPL-SCNC: 107 MMOL/L (ref 96–112)
CHOLEST SERPL-MCNC: 180 MG/DL (ref 100–199)
CO2 SERPL-SCNC: 28 MMOL/L (ref 20–33)
CREAT SERPL-MCNC: 0.9 MG/DL (ref 0.5–1.4)
ERYTHROCYTE [DISTWIDTH] IN BLOOD BY AUTOMATED COUNT: 48.2 FL (ref 35.9–50)
GFR SERPLBLD CREATININE-BSD FMLA CKD-EPI: 71 ML/MIN/1.73 M 2
GLOBULIN SER CALC-MCNC: 2.2 G/DL (ref 1.9–3.5)
GLUCOSE SERPL-MCNC: 89 MG/DL (ref 65–99)
HCT VFR BLD AUTO: 36.8 % (ref 37–47)
HDLC SERPL-MCNC: 42 MG/DL
HGB BLD-MCNC: 11.4 G/DL (ref 12–16)
LDLC SERPL CALC-MCNC: 115 MG/DL
MAGNESIUM SERPL-MCNC: 2 MG/DL (ref 1.5–2.5)
MCH RBC QN AUTO: 28.4 PG (ref 27–33)
MCHC RBC AUTO-ENTMCNC: 31 G/DL (ref 32.2–35.5)
MCV RBC AUTO: 91.8 FL (ref 81.4–97.8)
PLATELET # BLD AUTO: 219 K/UL (ref 164–446)
PMV BLD AUTO: 10.8 FL (ref 9–12.9)
POTASSIUM SERPL-SCNC: 4 MMOL/L (ref 3.6–5.5)
PROT SERPL-MCNC: 6 G/DL (ref 6–8.2)
RBC # BLD AUTO: 4.01 M/UL (ref 4.2–5.4)
SODIUM SERPL-SCNC: 145 MMOL/L (ref 135–145)
TRIGL SERPL-MCNC: 113 MG/DL (ref 0–149)
TROPONIN T SERPL-MCNC: 9 NG/L (ref 6–19)
WBC # BLD AUTO: 5.9 K/UL (ref 4.8–10.8)

## 2024-01-18 PROCEDURE — A9502 TC99M TETROFOSMIN: HCPCS

## 2024-01-18 PROCEDURE — 93018 CV STRESS TEST I&R ONLY: CPT | Performed by: INTERNAL MEDICINE

## 2024-01-18 PROCEDURE — 99239 HOSP IP/OBS DSCHRG MGMT >30: CPT | Performed by: HOSPITALIST

## 2024-01-18 PROCEDURE — 78452 HT MUSCLE IMAGE SPECT MULT: CPT | Mod: 26 | Performed by: INTERNAL MEDICINE

## 2024-01-18 PROCEDURE — 700111 HCHG RX REV CODE 636 W/ 250 OVERRIDE (IP): Performed by: HOSPITALIST

## 2024-01-18 PROCEDURE — 85027 COMPLETE CBC AUTOMATED: CPT

## 2024-01-18 PROCEDURE — 700102 HCHG RX REV CODE 250 W/ 637 OVERRIDE(OP): Performed by: HOSPITALIST

## 2024-01-18 PROCEDURE — G0378 HOSPITAL OBSERVATION PER HR: HCPCS

## 2024-01-18 PROCEDURE — A9270 NON-COVERED ITEM OR SERVICE: HCPCS | Performed by: HOSPITALIST

## 2024-01-18 PROCEDURE — 80061 LIPID PANEL: CPT

## 2024-01-18 PROCEDURE — 84484 ASSAY OF TROPONIN QUANT: CPT

## 2024-01-18 PROCEDURE — 94760 N-INVAS EAR/PLS OXIMETRY 1: CPT

## 2024-01-18 PROCEDURE — 83735 ASSAY OF MAGNESIUM: CPT

## 2024-01-18 PROCEDURE — 36415 COLL VENOUS BLD VENIPUNCTURE: CPT

## 2024-01-18 PROCEDURE — 80053 COMPREHEN METABOLIC PANEL: CPT

## 2024-01-18 RX ORDER — AMINOPHYLLINE 25 MG/ML
100 INJECTION, SOLUTION INTRAVENOUS
Status: DISCONTINUED | OUTPATIENT
Start: 2024-01-18 | End: 2024-01-18 | Stop reason: HOSPADM

## 2024-01-18 RX ORDER — REGADENOSON 0.08 MG/ML
0.4 INJECTION, SOLUTION INTRAVENOUS ONCE
Status: COMPLETED | OUTPATIENT
Start: 2024-01-18 | End: 2024-01-18

## 2024-01-18 RX ORDER — ROPINIROLE 0.25 MG/1
0.25 TABLET, FILM COATED ORAL 2 TIMES DAILY
COMMUNITY

## 2024-01-18 RX ADMIN — ASPIRIN 81 MG: 81 TABLET, COATED ORAL at 05:20

## 2024-01-18 RX ADMIN — REGADENOSON 0.4 MG: 0.08 INJECTION, SOLUTION INTRAVENOUS at 11:14

## 2024-01-18 RX ADMIN — GABAPENTIN 300 MG: 300 CAPSULE ORAL at 05:21

## 2024-01-18 RX ADMIN — BUPROPION HYDROCHLORIDE 150 MG: 150 TABLET, EXTENDED RELEASE ORAL at 05:21

## 2024-01-18 RX ADMIN — LISINOPRIL 40 MG: 20 TABLET ORAL at 05:21

## 2024-01-18 RX ADMIN — SERTRALINE HYDROCHLORIDE 100 MG: 50 TABLET ORAL at 05:21

## 2024-01-18 ASSESSMENT — FIBROSIS 4 INDEX: FIB4 SCORE: 0.93

## 2024-01-18 NOTE — PROGRESS NOTES
Telemetry Shift Summary     Rhythm: SR  HR: 73-98  Ectopy: r PVC    Measurements: 0.16/0.08/0.34    Normal Values  Rhythm: SR  HR:   Measurements: 0.12-0.20/0.08-0.10/0.30-0.52

## 2024-01-18 NOTE — CARE PLAN
The patient is Stable - Low risk of patient condition declining or worsening    Shift Goals  Clinical Goals: stress test in am, echo, monitor labs and telemetry    Progress made toward(s) clinical / shift goals:        Problem: Knowledge Deficit - Standard  Goal: Patient and family/care givers will demonstrate understanding of plan of care, disease process/condition, diagnostic tests and medications  Outcome: Progressing   Discussed plan of care with patient including medications, labs, NPO at 0000 for stress test, and echo. Patient is agreeable and verbalized understanding.   Problem: Fall Risk  Goal: Patient will remain free from falls  Outcome: Progressing   Patient educated on use of call light prior to getting up.     Patient is not progressing towards the following goals:

## 2024-01-18 NOTE — DISCHARGE SUMMARY
Discharge Summary    CHIEF COMPLAINT ON ADMISSION  Chief Complaint   Patient presents with    Chest Pain    Jaw Pain     Pain started in jaw 15 minutes ago and goes down into chest, describes as burning       Reason for Admission  Chest Pain     Admission Date  1/17/2024    CODE STATUS  Full Code    HPI & HOSPITAL COURSE  This is a 63 y.o. female here with chest pain.     Radha Smith has past medical history that includes hypertension and multiple sclerosis.  She presented to the emergency room on 1/17/2024 with acute onset of chest pain radiating to her neck and jaw.  Symptoms resolved spontaneously.  The patient was hospitalized, serial troponins remain within normal limits, nuclear medicine stress test was performed today and shows no evidence of ischemia.  Results discussed with the patient and her .  She can be discharged home today.  Follow-up with primary care physician    Therefore, she is discharged in good and stable condition to home with close outpatient follow-up.    Discharge Date  1/18/2024    FOLLOW UP ITEMS POST DISCHARGE  Follow-up with primary care provider for management of chronic medical issues as well as neurology/[facility] infusion    DISCHARGE DIAGNOSES  Principal Problem (Resolved):    Chest pain (POA: Yes)  Active Problems:    Multiple sclerosis (HCC) (POA: Yes)    Neuropathy (POA: Yes)    Anxiety (POA: Yes)    Primary hypertension (POA: Yes)    Abnormal EKG (POA: Yes)      FOLLOW UP  Future Appointments   Date Time Provider Department Center   2/1/2024  9:30 AM [facility] [address]   3/14/2024  9:30 AM [facility] [address]   4/25/2024  9:30 AM [facility] [address]     No follow-up provider specified.    MEDICATIONS ON DISCHARGE     Medication List        CONTINUE taking these medications        Instructions   acetaminophen 500 MG Tabs  Commonly known as: Tylenol   Take 1,000 mg by mouth every 6 hours as needed. Indications: Pain  Dose: 1,000  mg     Breo Ellipta 200-25 MCG/ACT Aepb  Generic drug: fluticasone furoate-vilanterol   Inhale 1 Puff every day. Indications: Asthma  Dose: 1 Puff     buPROPion  MG Tb12 sustained-release tablet  Commonly known as: Wellbutrin-SR   Take 150 mg by mouth every day. Pt takes only once a day, not BID  Dose: 150 mg     D3 PO   Take 1 Capsule by mouth every evening.  Dose: 1 Capsule     gabapentin 300 MG Caps  Commonly known as: Neurontin   Take 300 mg by mouth 2 times a day.  Dose: 300 mg     HYDROcodone-acetaminophen 5-325 MG Tabs per tablet  Commonly known as: Norco   Take 1 Tablet by mouth every 6 hours as needed. Indications: Pain  Dose: 1 Tablet     lisinopril 40 MG tablet  Commonly known as: Prinivil   Take 40 mg by mouth every day.  Dose: 40 mg     Multi-Vitamins Tabs   Take 1 Tablet by mouth every day.  Dose: 1 Tablet     natalizumab 300 MG/15ML Conc  Commonly known as: Tysabri   Infuse  into a venous catheter see administration instructions. Pt receives every 6 weeks, last dose taken on 12/21/2023     ROPINIRole 0.25 MG Tabs  Commonly known as: Requip   Take 0.25 mg by mouth 2 times a day. Indications: Restless Leg Syndrome  Dose: 0.25 mg     sertraline 100 MG Tabs  Commonly known as: Zoloft   Take 100 mg by mouth every day.  Dose: 100 mg     triamterene/hctz 37.5-25 MG Caps  Commonly known as: Maxzide-25/Dyazide   Take 1 Capsule by mouth every day.  Dose: 1 Capsule     Ventolin  (90 Base) MCG/ACT Aers inhalation aerosol  Generic drug: albuterol   Inhale 2 Puffs every four hours as needed for Shortness of Breath. Indications: Asthma  Dose: 2 Puff     VITAMIN C PO   Take 1 Tablet by mouth every day.  Dose: 1 Tablet              Allergies  No Known Allergies    DIET  Orders Placed This Encounter   Procedures    Diet Order Diet: Cardiac     Standing Status:   Standing     Number of Occurrences:   1     Order Specific Question:   Diet:     Answer:   Cardiac [6]       ACTIVITY  As tolerated.  Weight  bearing as tolerated    CONSULTATIONS  None    PROCEDURES    1/18/24: Nuclear Medicine Stress Test   NUCLEAR IMAGING INTERPRETATION   No evidence of significant jeopardized viable myocardium or prior myocardial    infarction.   Normal left ventricular size, ejection fraction, and wall motion.   ECG INTERPRETATION   Negative stress ECG for ischemia.    CXR 1/17/24:  IMPRESSION:  1. No acute cardiopulmonary abnormalities are identified.       LABORATORY  Lab Results   Component Value Date    SODIUM 145 01/18/2024    POTASSIUM 4.0 01/18/2024    CHLORIDE 107 01/18/2024    CO2 28 01/18/2024    GLUCOSE 89 01/18/2024    BUN 15 01/18/2024    CREATININE 0.90 01/18/2024    GLOMRATE 52 (L) 10/13/2023        Lab Results   Component Value Date    WBC 5.9 01/18/2024    HEMOGLOBIN 11.4 (L) 01/18/2024    HEMATOCRIT 36.8 (L) 01/18/2024    PLATELETCT 219 01/18/2024        Total time of the discharge process exceeds 35 minutes.

## 2024-02-01 ENCOUNTER — APPOINTMENT (OUTPATIENT)
Dept: ONCOLOGY | Facility: MEDICAL CENTER | Age: 64
End: 2024-02-01
Attending: SPECIALIST
Payer: MEDICARE

## 2024-02-21 ENCOUNTER — OFFICE VISIT (OUTPATIENT)
Dept: NEUROLOGY | Facility: MEDICAL CENTER | Age: 64
End: 2024-02-21
Attending: SPECIALIST
Payer: MEDICARE

## 2024-02-21 VITALS
WEIGHT: 203 LBS | BODY MASS INDEX: 38.33 KG/M2 | HEIGHT: 61 IN | TEMPERATURE: 98.3 F | DIASTOLIC BLOOD PRESSURE: 74 MMHG | HEART RATE: 101 BPM | RESPIRATION RATE: 16 BRPM | OXYGEN SATURATION: 98 % | SYSTOLIC BLOOD PRESSURE: 128 MMHG

## 2024-02-21 DIAGNOSIS — G35 MULTIPLE SCLEROSIS (HCC): ICD-10-CM

## 2024-02-21 PROCEDURE — 3074F SYST BP LT 130 MM HG: CPT | Performed by: SPECIALIST

## 2024-02-21 PROCEDURE — 99213 OFFICE O/P EST LOW 20 MIN: CPT | Performed by: SPECIALIST

## 2024-02-21 PROCEDURE — 3078F DIAST BP <80 MM HG: CPT | Performed by: SPECIALIST

## 2024-02-21 PROCEDURE — 99212 OFFICE O/P EST SF 10 MIN: CPT | Performed by: SPECIALIST

## 2024-02-21 ASSESSMENT — FIBROSIS 4 INDEX: FIB4 SCORE: 0.93

## 2024-02-21 NOTE — PROGRESS NOTES
"Subjective     Radha Smith is a 63 y.o. female who presents with history of multiple sclerosis.          HPI  Mrs. Jyotsna Smith is a 63-year-old woman with a history of multiple sclerosis since 1997.  She last saw them in my former office on October 11.  At that time my note indicates that she had FLACO virus antibodies on May 10 that were negative.  She had to start her infusions on November 9 and December 21 and is currently 3 weeks late.  She has had no new neurological symptoms.  She had a left knee replacement and continues to have pain in the knee.  She has had no new neurological symptoms.  Her most recent brain MRI scan was on 6/30/2022 and she had relatively extensive burden of disease.  She previously saw Dr. Tenorio in this office and also was seen at [facility] by Dr. Pinedo.  She has taken Tysabri for approximately 15 years.  She has not had active disease for some time.  She previously failed repair of Avonex and Copaxone.    Past medical history:    1.  Multiple sclerosis as above    2.  Bilateral knee surgeries    3.  Status post cholecystectomy    Medications-Tysabri 300 mg every 6 weeks, ropinirole 0.25 mg at night, Zoloft 100 mg daily    Allergies-NKDA    Social history-she is a non-smoker            ROS  As above.  She has noted chronic problems with her balance and \"dizziness\" and at times fatigue which she attributes to the multiple sclerosis.         Objective     /74 (BP Location: Left arm, Patient Position: Sitting, BP Cuff Size: Adult)   Pulse (!) 101   Temp 36.8 °C (98.3 °F) (Temporal)   Resp 16   Ht 1.549 m (5' 1\")   Wt 92.1 kg (203 lb)   SpO2 98%   BMI 38.36 kg/m²      Physical Exam  Patient is a cooperative woman who is alert.  Her speech is fluent and mental status is grossly intact.    HEENT exam reveals her to be normocephalic and atraumatic.  Pupils are 3 mm and reactive to light.  EOMs are full without nystagmus, visual fields are full and optic discs are " flat.    Her neck is supple.        Neurological Exam  She stands without difficulty.  Her gait is mildly antalgic due to knee pain and mildly wide-based.    Motor testing reveals intact bulk tone and strength throughout.    Sensory testing is intact to pin.    Reflexes are 2+ at the arms and knees trace at the ankles and toes are downgoing.    Cranial nerves are unremarkable.           Assessment & Plan        1. Multiple sclerosis (HCC)  Mrs. Jyotsna Smith is a 63-year-old woman with a history of multiple sclerosis diagnosed in 1997.  She has been stable on Tessari for some time.  Unfortunately she cannot recall when she last had FLACO virus antibodies done.  My office is attempting to get them from [facility] but they require formal request and it takes between 1 to 2 days to get the antibodies.  As a result she is instructed not to be infused with December and once again until I unable to make sure that she has had up-to-date FLACO virus antibody levels done.  She will have a repeat brain MRI scan and see me back in 3 months.  She is given a form to get FLACO virus antibodies from [facility].  - natalizumab (TYSABRI) 300 MG/15ML Conc; Infuse 15 mL into a venous catheter see administration instructions. Pt receives every 6 weeks, last dose taken on 12/21/2023  Dispense: 14.84 mL; Refill: 4  - MR-BRAIN-WITH & W/O; Future

## 2024-03-06 ENCOUNTER — OUTPATIENT INFUSION SERVICES (OUTPATIENT)
Dept: ONCOLOGY | Facility: MEDICAL CENTER | Age: 64
End: 2024-03-06
Attending: SPECIALIST
Payer: MEDICARE

## 2024-03-06 VITALS
SYSTOLIC BLOOD PRESSURE: 141 MMHG | DIASTOLIC BLOOD PRESSURE: 75 MMHG | TEMPERATURE: 97 F | BODY MASS INDEX: 38.92 KG/M2 | OXYGEN SATURATION: 94 % | RESPIRATION RATE: 18 BRPM | HEIGHT: 61 IN | HEART RATE: 103 BPM | WEIGHT: 206.13 LBS

## 2024-03-06 DIAGNOSIS — G35 MULTIPLE SCLEROSIS (HCC): ICD-10-CM

## 2024-03-06 PROCEDURE — 96365 THER/PROPH/DIAG IV INF INIT: CPT

## 2024-03-06 PROCEDURE — 700111 HCHG RX REV CODE 636 W/ 250 OVERRIDE (IP): Mod: JZ,JG | Performed by: SPECIALIST

## 2024-03-06 PROCEDURE — 700105 HCHG RX REV CODE 258: Performed by: SPECIALIST

## 2024-03-06 RX ORDER — SODIUM CHLORIDE 9 MG/ML
INJECTION, SOLUTION INTRAVENOUS CONTINUOUS
OUTPATIENT
Start: 2024-03-13

## 2024-03-06 RX ADMIN — NATALIZUMAB 300 MG: 300 INJECTION INTRAVENOUS at 16:34

## 2024-03-06 ASSESSMENT — FIBROSIS 4 INDEX: FIB4 SCORE: 0.93

## 2024-03-14 ENCOUNTER — APPOINTMENT (OUTPATIENT)
Dept: ONCOLOGY | Facility: MEDICAL CENTER | Age: 64
End: 2024-03-14
Payer: MEDICARE

## 2024-04-16 ENCOUNTER — TELEPHONE (OUTPATIENT)
Dept: NEUROLOGY | Facility: MEDICAL CENTER | Age: 64
End: 2024-04-16
Payer: MEDICARE

## 2024-04-16 DIAGNOSIS — G35 MULTIPLE SCLEROSIS (HCC): ICD-10-CM

## 2024-04-16 RX ORDER — NATALIZUMAB 300 MG/15ML
300 INJECTION INTRAVENOUS
Qty: 14.84 ML | Refills: 5 | Status: SHIPPED | OUTPATIENT
Start: 2024-04-16

## 2024-04-16 NOTE — TELEPHONE ENCOUNTER
Jevon Obrien I received a message from patient and you need to rewrite a rx for patients infusion for her tysabri and put the diagnosis, there are a couple of orders in her chart ut they are not written by you Thanks, Davina

## 2024-04-19 ENCOUNTER — OUTPATIENT INFUSION SERVICES (OUTPATIENT)
Dept: ONCOLOGY | Facility: MEDICAL CENTER | Age: 64
End: 2024-04-19
Attending: SPECIALIST
Payer: MEDICARE

## 2024-04-19 VITALS
HEART RATE: 77 BPM | DIASTOLIC BLOOD PRESSURE: 76 MMHG | TEMPERATURE: 98.4 F | SYSTOLIC BLOOD PRESSURE: 118 MMHG | WEIGHT: 211.86 LBS | BODY MASS INDEX: 40 KG/M2 | RESPIRATION RATE: 18 BRPM | OXYGEN SATURATION: 98 % | HEIGHT: 61 IN

## 2024-04-19 DIAGNOSIS — G35 MULTIPLE SCLEROSIS (HCC): ICD-10-CM

## 2024-04-19 PROCEDURE — 96365 THER/PROPH/DIAG IV INF INIT: CPT

## 2024-04-19 PROCEDURE — 700111 HCHG RX REV CODE 636 W/ 250 OVERRIDE (IP): Mod: JZ,JG | Performed by: SPECIALIST

## 2024-04-19 PROCEDURE — 700105 HCHG RX REV CODE 258: Performed by: SPECIALIST

## 2024-04-19 RX ORDER — SODIUM CHLORIDE 9 MG/ML
INJECTION, SOLUTION INTRAVENOUS CONTINUOUS
OUTPATIENT
Start: 2024-05-31

## 2024-04-19 RX ADMIN — NATALIZUMAB 300 MG: 300 INJECTION INTRAVENOUS at 09:43

## 2024-04-19 ASSESSMENT — FIBROSIS 4 INDEX: FIB4 SCORE: 0.93

## 2024-04-19 NOTE — PROGRESS NOTES
Pt arrived ambulatory to IS for Tysabri. Pt denied fever, signs or symptoms of infection or acute illness today. POC discussed and pt verbalized understanding. TOUCH screening questionnaire completed. Pt is authorized for transfusion.     PIV established; pt tolerated well. Tysabri administered per MAR; pt tolerated well. No signs or symptoms of reaction or complications noted. PIV flushed and removed with tip intact; pt tolerated well. Gauze and coban applied to site.     Follow-up care discussed and next appointments confirmedfor pt; pt verbalized understanding. Pt discharged home to self care in no apparent distress at this time.

## 2024-05-22 ENCOUNTER — APPOINTMENT (OUTPATIENT)
Dept: NEUROLOGY | Facility: MEDICAL CENTER | Age: 64
End: 2024-05-22
Attending: SPECIALIST
Payer: MEDICARE

## 2024-05-31 ENCOUNTER — OUTPATIENT INFUSION SERVICES (OUTPATIENT)
Dept: ONCOLOGY | Facility: MEDICAL CENTER | Age: 64
End: 2024-05-31
Attending: SPECIALIST
Payer: MEDICARE

## 2024-05-31 VITALS
SYSTOLIC BLOOD PRESSURE: 134 MMHG | TEMPERATURE: 98.1 F | HEIGHT: 61 IN | OXYGEN SATURATION: 96 % | RESPIRATION RATE: 18 BRPM | DIASTOLIC BLOOD PRESSURE: 76 MMHG | HEART RATE: 83 BPM | WEIGHT: 216.49 LBS | BODY MASS INDEX: 40.87 KG/M2

## 2024-05-31 DIAGNOSIS — G35 MULTIPLE SCLEROSIS (HCC): ICD-10-CM

## 2024-05-31 RX ORDER — SODIUM CHLORIDE 9 MG/ML
INJECTION, SOLUTION INTRAVENOUS CONTINUOUS
OUTPATIENT
Start: 2024-05-31

## 2024-05-31 RX ADMIN — NATALIZUMAB 300 MG: 300 INJECTION INTRAVENOUS at 09:40

## 2024-05-31 ASSESSMENT — FIBROSIS 4 INDEX: FIB4 SCORE: 0.93

## 2024-05-31 NOTE — PROGRESS NOTES
Radha presents to infusion for Q6 week Tysabri infusion, denies recent illness, infection or vaccination.    Tysabri touch questionnaire completed, patient denies any new neurological symptoms.     PIV started with positive blood return.    Tysabri infused over 1 hour, patient tolerated well with no adverse effects.    PIV flushed post infusion with positive blood return, d/daquan with tip intact. Patient left in stable condition, knows to check My Chart for next appt.

## 2024-06-12 ENCOUNTER — OFFICE VISIT (OUTPATIENT)
Dept: NEUROLOGY | Facility: MEDICAL CENTER | Age: 64
End: 2024-06-12
Attending: SPECIALIST
Payer: MEDICARE

## 2024-06-12 VITALS
HEIGHT: 62 IN | WEIGHT: 217.37 LBS | BODY MASS INDEX: 40 KG/M2 | RESPIRATION RATE: 20 BRPM | DIASTOLIC BLOOD PRESSURE: 78 MMHG | SYSTOLIC BLOOD PRESSURE: 124 MMHG | OXYGEN SATURATION: 96 % | HEART RATE: 82 BPM | TEMPERATURE: 97.6 F

## 2024-06-12 DIAGNOSIS — G35 MULTIPLE SCLEROSIS (HCC): ICD-10-CM

## 2024-06-12 PROCEDURE — 3074F SYST BP LT 130 MM HG: CPT | Performed by: SPECIALIST

## 2024-06-12 PROCEDURE — 3078F DIAST BP <80 MM HG: CPT | Performed by: SPECIALIST

## 2024-06-12 PROCEDURE — 99214 OFFICE O/P EST MOD 30 MIN: CPT | Performed by: SPECIALIST

## 2024-06-12 PROCEDURE — 99212 OFFICE O/P EST SF 10 MIN: CPT | Performed by: SPECIALIST

## 2024-06-12 RX ORDER — NATALIZUMAB 300 MG/15ML
300 INJECTION INTRAVENOUS SEE ADMIN INSTRUCTIONS
Qty: 14.84 ML | Refills: 4 | Status: SHIPPED | OUTPATIENT
Start: 2024-06-12

## 2024-06-12 ASSESSMENT — FIBROSIS 4 INDEX: FIB4 SCORE: 0.93

## 2024-06-12 NOTE — PROGRESS NOTES
"Subjective     Radha Smith is a 63 y.o. female who presents with Follow-Up (Multiple sclerosis (HCC))            HPI  Since I saw  February 21 she had 3 subsequent Tysabri infusions, on March 6, April 19 and May 31.  She had a FLACO virus antibody drawn on March 27 and it was negative with a titer of 0.12.  She has been asymptomatic regarding her MS.  She is having no new neurological symptoms.  She had a brain MRI scan on April 9 possibly showed 2 new lesions but it was unclear with certainty.  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  She had a CHEM panel and CBC in January that were unremarkable.  She has had a history of multiple sclerosis since 1997 and is likely taking to several for 15 to 20 years.    Past medical history:    1.  Multiple sclerosis    2.  Bilateral knee surgeries    3.  Status post cholecystectomy    Medications-7300 mg IV every 6 weeks, lisinopril 40 mg daily, gabapentin 300 mg twice daily, Maxide/Dyazide    Allergies-NKDA    ROS  As above she feels she is asymptomatic regarding her multiple sclerosis.         Objective     /78 (BP Location: Left arm, Patient Position: Sitting, BP Cuff Size: Large adult)   Pulse 82   Temp 36.4 °C (97.6 °F) (Temporal)   Resp 20   Ht 1.562 m (5' 1.5\")   Wt 98.6 kg (217 lb 6 oz)   SpO2 96%   BMI 40.41 kg/m²      Physical Exam  Patient is a cooperative woman who appears her stated age.  Speech is fluent and mental status is grossly intact.    HEENT exam reveals to be normocephalic and atraumatic.  Pupils equal round reactive.  EOMs are full visual fields are full and discs are flat.    Her neck is supple.        Neurological Exam  She stands without difficulty.  Her gait is narrow-based and unremarkable.  She has no drift and no dysmetria.    Motor testing reveals intact bulk, strength throughout.    Sensory testing is intact to temperature.    Reflexes are slightly " hyperactive throughout the arms and knees trace at the ankles and she has downgoing toes.    Cranial nerves are unremarkable.           Assessment & Plan        1. Multiple sclerosis (HCC)  The patient is a 63-year-old woman with a history of multiple sclerosis since 1997.  She is taken Tysabri for 15 to 20 years.  The risk of progressive multifocal leukoencephalopathy is again discussed and she elects to continue taking December infusions every 6 weeks.  She needs to have another FLACO virus antibody drawn in September.  I will see her back at that point and if negative the medication will be renewed and then.  - natalizumab (TYSABRI) 300 MG/15ML Conc; Infuse 15 mL into a venous catheter see administration instructions.  Dispense: 14.84 mL; Refill: 4

## 2024-07-12 ENCOUNTER — APPOINTMENT (OUTPATIENT)
Dept: ONCOLOGY | Facility: MEDICAL CENTER | Age: 64
End: 2024-07-12
Attending: SPECIALIST
Payer: MEDICARE

## 2024-07-12 ENCOUNTER — TELEPHONE (OUTPATIENT)
Dept: ONCOLOGY | Facility: MEDICAL CENTER | Age: 64
End: 2024-07-12
Payer: MEDICARE

## 2024-07-18 ENCOUNTER — OUTPATIENT INFUSION SERVICES (OUTPATIENT)
Dept: ONCOLOGY | Facility: MEDICAL CENTER | Age: 64
End: 2024-07-18
Attending: SPECIALIST
Payer: MEDICARE

## 2024-07-18 VITALS
BODY MASS INDEX: 42.72 KG/M2 | OXYGEN SATURATION: 96 % | WEIGHT: 217.59 LBS | HEART RATE: 71 BPM | TEMPERATURE: 97.2 F | DIASTOLIC BLOOD PRESSURE: 73 MMHG | RESPIRATION RATE: 18 BRPM | HEIGHT: 60 IN | SYSTOLIC BLOOD PRESSURE: 135 MMHG

## 2024-07-18 DIAGNOSIS — G35 MULTIPLE SCLEROSIS (HCC): ICD-10-CM

## 2024-07-18 DIAGNOSIS — G35 MULTIPLE SCLEROSIS (HCC): Primary | ICD-10-CM

## 2024-07-18 PROCEDURE — 96365 THER/PROPH/DIAG IV INF INIT: CPT

## 2024-07-18 PROCEDURE — 700111 HCHG RX REV CODE 636 W/ 250 OVERRIDE (IP): Mod: JZ,JG | Performed by: SPECIALIST

## 2024-07-18 PROCEDURE — 700105 HCHG RX REV CODE 258: Performed by: SPECIALIST

## 2024-07-18 RX ORDER — EPINEPHRINE 1 MG/ML(1)
0.5 AMPUL (ML) INJECTION PRN
Status: CANCELLED | OUTPATIENT
Start: 2024-07-18

## 2024-07-18 RX ORDER — DIPHENHYDRAMINE HYDROCHLORIDE 50 MG/ML
50 INJECTION INTRAMUSCULAR; INTRAVENOUS PRN
Status: CANCELLED | OUTPATIENT
Start: 2024-07-18

## 2024-07-18 RX ORDER — SODIUM CHLORIDE 9 MG/ML
INJECTION, SOLUTION INTRAVENOUS CONTINUOUS
OUTPATIENT
Start: 2024-08-29

## 2024-07-18 RX ORDER — DIPHENHYDRAMINE HYDROCHLORIDE 50 MG/ML
50 INJECTION INTRAMUSCULAR; INTRAVENOUS PRN
OUTPATIENT
Start: 2024-08-29

## 2024-07-18 RX ORDER — 0.9 % SODIUM CHLORIDE 0.9 %
10 VIAL (ML) INJECTION PRN
OUTPATIENT
Start: 2024-08-29

## 2024-07-18 RX ORDER — 0.9 % SODIUM CHLORIDE 0.9 %
3 VIAL (ML) INJECTION PRN
Status: CANCELLED | OUTPATIENT
Start: 2024-07-18

## 2024-07-18 RX ORDER — METHYLPREDNISOLONE SODIUM SUCCINATE 125 MG/2ML
125 INJECTION, POWDER, LYOPHILIZED, FOR SOLUTION INTRAMUSCULAR; INTRAVENOUS PRN
OUTPATIENT
Start: 2024-08-29

## 2024-07-18 RX ORDER — SODIUM CHLORIDE 9 MG/ML
INJECTION, SOLUTION INTRAVENOUS CONTINUOUS
Status: CANCELLED | OUTPATIENT
Start: 2024-07-18

## 2024-07-18 RX ORDER — 0.9 % SODIUM CHLORIDE 0.9 %
VIAL (ML) INJECTION PRN
Status: CANCELLED | OUTPATIENT
Start: 2024-07-18

## 2024-07-18 RX ORDER — NATALIZUMAB 300 MG/15ML
300 INJECTION INTRAVENOUS
Qty: 14.84 ML | Refills: 11 | Status: SHIPPED | OUTPATIENT
Start: 2024-07-18

## 2024-07-18 RX ORDER — 0.9 % SODIUM CHLORIDE 0.9 %
3 VIAL (ML) INJECTION PRN
OUTPATIENT
Start: 2024-08-29

## 2024-07-18 RX ORDER — EPINEPHRINE 1 MG/ML(1)
0.5 AMPUL (ML) INJECTION PRN
OUTPATIENT
Start: 2024-08-29

## 2024-07-18 RX ORDER — 0.9 % SODIUM CHLORIDE 0.9 %
VIAL (ML) INJECTION PRN
OUTPATIENT
Start: 2024-08-29

## 2024-07-18 RX ORDER — 0.9 % SODIUM CHLORIDE 0.9 %
10 VIAL (ML) INJECTION PRN
Status: CANCELLED | OUTPATIENT
Start: 2024-07-18

## 2024-07-18 RX ORDER — METHYLPREDNISOLONE SODIUM SUCCINATE 125 MG/2ML
125 INJECTION, POWDER, LYOPHILIZED, FOR SOLUTION INTRAMUSCULAR; INTRAVENOUS PRN
Status: CANCELLED | OUTPATIENT
Start: 2024-07-18

## 2024-07-18 RX ADMIN — NATALIZUMAB 300 MG: 300 INJECTION INTRAVENOUS at 10:15

## 2024-07-18 ASSESSMENT — FIBROSIS 4 INDEX: FIB4 SCORE: 0.93

## 2024-08-23 ENCOUNTER — APPOINTMENT (OUTPATIENT)
Dept: ONCOLOGY | Facility: MEDICAL CENTER | Age: 64
End: 2024-08-23
Payer: MEDICARE

## 2024-08-29 ENCOUNTER — APPOINTMENT (OUTPATIENT)
Dept: ONCOLOGY | Facility: MEDICAL CENTER | Age: 64
End: 2024-08-29
Attending: SPECIALIST
Payer: MEDICARE

## 2024-09-01 ENCOUNTER — OUTPATIENT INFUSION SERVICES (OUTPATIENT)
Dept: ONCOLOGY | Facility: MEDICAL CENTER | Age: 64
End: 2024-09-01
Attending: SPECIALIST
Payer: MEDICARE

## 2024-09-01 VITALS
HEIGHT: 61 IN | BODY MASS INDEX: 41.29 KG/M2 | TEMPERATURE: 97.3 F | HEART RATE: 87 BPM | RESPIRATION RATE: 18 BRPM | WEIGHT: 218.7 LBS | DIASTOLIC BLOOD PRESSURE: 77 MMHG | OXYGEN SATURATION: 96 % | SYSTOLIC BLOOD PRESSURE: 134 MMHG

## 2024-09-01 DIAGNOSIS — G35 MULTIPLE SCLEROSIS (HCC): ICD-10-CM

## 2024-09-01 PROCEDURE — 96365 THER/PROPH/DIAG IV INF INIT: CPT

## 2024-09-01 PROCEDURE — 700111 HCHG RX REV CODE 636 W/ 250 OVERRIDE (IP): Mod: JZ,JG | Performed by: SPECIALIST

## 2024-09-01 PROCEDURE — 700105 HCHG RX REV CODE 258: Performed by: SPECIALIST

## 2024-09-01 RX ORDER — EPINEPHRINE 1 MG/ML(1)
0.5 AMPUL (ML) INJECTION PRN
OUTPATIENT
Start: 2024-10-06

## 2024-09-01 RX ORDER — SODIUM CHLORIDE 9 MG/ML
INJECTION, SOLUTION INTRAVENOUS CONTINUOUS
OUTPATIENT
Start: 2024-10-06

## 2024-09-01 RX ORDER — METHYLPREDNISOLONE SODIUM SUCCINATE 125 MG/2ML
125 INJECTION, POWDER, LYOPHILIZED, FOR SOLUTION INTRAMUSCULAR; INTRAVENOUS PRN
OUTPATIENT
Start: 2024-10-06

## 2024-09-01 RX ORDER — 0.9 % SODIUM CHLORIDE 0.9 %
3 VIAL (ML) INJECTION PRN
OUTPATIENT
Start: 2024-10-06

## 2024-09-01 RX ORDER — 0.9 % SODIUM CHLORIDE 0.9 %
10 VIAL (ML) INJECTION PRN
OUTPATIENT
Start: 2024-10-06

## 2024-09-01 RX ORDER — DIPHENHYDRAMINE HYDROCHLORIDE 50 MG/ML
50 INJECTION INTRAMUSCULAR; INTRAVENOUS PRN
OUTPATIENT
Start: 2024-10-06

## 2024-09-01 RX ORDER — 0.9 % SODIUM CHLORIDE 0.9 %
VIAL (ML) INJECTION PRN
OUTPATIENT
Start: 2024-10-06

## 2024-09-01 RX ADMIN — NATALIZUMAB 300 MG: 300 INJECTION INTRAVENOUS at 10:32

## 2024-09-01 ASSESSMENT — FIBROSIS 4 INDEX: FIB4 SCORE: 0.93

## 2024-09-01 NOTE — PROGRESS NOTES
Radha arrives to [facility] for q 6 week Tysabri for MS. Patient denies acute health concerns. Denies MS exacerbations. TOUCH questionnaire completed prior to start of treatment. 24g PIV placed to RAC, which flushes easily and has brisk blood return. Tysabri infused over 1 hr without adverse s/s. Patient tolerated infusion well. No observation required. Emailed scheduling regarding next appt. Discharged home to self care in no apparent distress.

## 2024-09-16 ENCOUNTER — OFFICE VISIT (OUTPATIENT)
Dept: NEUROLOGY | Facility: MEDICAL CENTER | Age: 64
End: 2024-09-16
Attending: SPECIALIST
Payer: MEDICARE

## 2024-09-16 VITALS
HEART RATE: 69 BPM | TEMPERATURE: 98 F | SYSTOLIC BLOOD PRESSURE: 110 MMHG | HEIGHT: 61 IN | RESPIRATION RATE: 12 BRPM | DIASTOLIC BLOOD PRESSURE: 68 MMHG | BODY MASS INDEX: 41.75 KG/M2 | OXYGEN SATURATION: 99 % | WEIGHT: 221.12 LBS

## 2024-09-16 DIAGNOSIS — G35 MULTIPLE SCLEROSIS (HCC): Primary | ICD-10-CM

## 2024-09-16 PROCEDURE — 99213 OFFICE O/P EST LOW 20 MIN: CPT | Performed by: SPECIALIST

## 2024-09-16 PROCEDURE — 3074F SYST BP LT 130 MM HG: CPT | Performed by: SPECIALIST

## 2024-09-16 PROCEDURE — 99212 OFFICE O/P EST SF 10 MIN: CPT | Performed by: SPECIALIST

## 2024-09-16 PROCEDURE — 3078F DIAST BP <80 MM HG: CPT | Performed by: SPECIALIST

## 2024-09-16 ASSESSMENT — FIBROSIS 4 INDEX: FIB4 SCORE: 0.93

## 2024-09-16 NOTE — PROGRESS NOTES
CHIEF COMPLAINT:  Chief Complaint   Patient presents with   • Skin Assessment     TBSE,        SUBJECTIVE:  Patient is 54 year old female who presents for TBSE  Patient reports need for refill of the tretinoin cream 0.1% as hers has , from the previous derm she seen like 4 years ago.  Patient does have a history of Rosacea and would like to discuss.  Patient reports she has breaking out on the face more than usual and would like to further discuss.  No other skin concerns.    Interim H/O 21  consult referral from Dr. Ferrera for:     TBSE. Last TBSE was 1 year ago with another dermatologist. She has a specific concern of a new lesion that appeared on her face. Otherwise, no other skin concerns.   Patient denies any other skin problems.  Denies easy bruising or bleeding.     Skin Cancer History  History of skin cancer--Negative    REVIEW OF SYSTEMS:  Pertinent positives:  Skin lesion  Pertinent negatives:  No fever, no chills.    PAST, FAMILY, SOCIAL HISTORY:  Family history of skin cancer-- father, basal cell  History of severe sunburns--Generalized in childhood  Sunscreens--occasional    Family History   Problem Relation Age of Onset   • Cancer, Skin Melanoma Father    • Coronary Artery Disease Father    • Cancer, Prostate Father    • Alcohol Abuse Father    • Hypertension Mother    • Alcohol Abuse Mother    • Rheumatoid Arthritis Maternal Grandmother    • Coronary Artery Disease Maternal Aunt    • Cancer, Colon Neg Hx    • Diabetes Neg Hx           Social History     Tobacco Use   • Smoking status: Never   • Smokeless tobacco: Never   Vaping Use   • Vaping Use: never used   Substance Use Topics   • Alcohol use: Yes     Comment: weekends   • Drug use: Never       Past Medical History:   Diagnosis Date   • DCIS (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 female who presents with Follow-Up (Ms /)            HPI  Mrs. Radha Smith well since she saw me on June 12.  She had to tysabri infusions on July 18 and September 1 without difficulty.  She is likely taken medication for 15 to 20 years.  She has had no new neurological symptoms and feels she is asymptomatic.  The patient was admitted and diagnosed with multiple sclerosis in 1997.  She was seen at [facility] by Dr. Pinedo in this office by Dr. Tenorio.  She failed Avonex and Copaxone previously.  She has not had active disease for some time.  She had a brain MRI scan on April 9 that possibly showed 2 new lesions but it was unclear with certainty.  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    Past medical history:    1.  Multiple sclerosis    2.  Bilateral knee surgeries    3.  Status post cholecystectomy    4.  Hypertension medication tysabri 300 mg every 6 weeks, lisinopril 40 mg daily, gabapentin 300 mg twice daily, Maxide-Dyazide    Allergies -NKDA      ROS  As above, she feels she is asymptomatic regarding her multiple sclerosis.         Objective     /68 (BP Location: Left arm, Patient Position: Sitting, BP Cuff Size: Adult)   Pulse 69   Temp 36.7 °C (98 °F) (Temporal)   Resp 12   Ht 1.549 m (5' 1\")   Wt 100 kg (221 lb 1.9 oz)   SpO2 99%   BMI 41.78 kg/m²      Physical Exam  Patient is a cooperative woman who is alert.  Speech is fluent and mental status is grossly intact.    HEENT exam reveals normocephalic.  Pupils are equal round reactive.  EMs are full visual fields are full.    Her neck is supple.        Neurological Exam  She stands without difficulty.  Her gait is narrow-based and unremarkable.  She has no drift and no dysmetria.    Motor testing reveals intact bulk tone and strength throughout.    Sensory testing is intact to pin.    Reflexes are slightly hyperactive throughout the arms " (Food Or Med) HIVES   • Seasonal Other (See Comments)       Current Outpatient Medications   Medication Sig Dispense Refill   • meloxicam (MOBIC) 15 MG tablet Take 1 tablet by mouth daily as needed for Pain. 90 tablet 0   • acetaminophen (TYLENOL) 650 MG CR tablet Take 1 tablet by mouth every 8 hours as needed for Pain. 90 tablet 0   • diclofenac (VOLTAREN) 1 % gel Apply 2 g topically 4 times daily as needed (pain). 350 g 1   • OMEGA 3-6-9 FATTY ACIDS PO Take 1 tablet by mouth daily.     • azelaic acid (FINACEA) 15 % gel Apply topically as needed.     • Cholecalciferol (vitamin D3) 125 mcg (5,000 units) capsule Take 125 mcg by mouth.     • Cyanocobalamin (Vitamin B-12 CR) 1000 MCG Tab CR Take 1,000 mcg by mouth daily.     • Ascorbic Acid (VITAMIN C PO) Take 1 tablet by mouth daily.     • MAGNESIUM PO Take 1 tablet by mouth daily.     • TURMERIC CURCUMIN PO Take 1 capsule by mouth daily.       No current facility-administered medications for this visit.       There were no vitals taken for this visit.        OBJECTIVE:   General Appearance:  Patient is AO (alert and oriented) x3, WDWN (well developed, well nourished) in NAD (no acute distress).  Patient is well groomed.  Mood/Affect:   Stable/Normal.  Total skin exam -- Thorough evaluation of head (including face and scalp), eyelids, lips, neck,  chest, back, abdomen, upper extremities, lower extremities, buttocks notable for:  Scalp:  Clear, no concerning lesions.   Face:  RT temple light brown waxy hyperkeratotic papule. Tan brown macules uniformly pigmented and symmetrical in sun distribution. Dry scaly patches on the forehead and few acneiform papules on the face.  Neck: brown papules uniformly pigmented and symmetrical, waxy hyperkeratotic stuck on papules.   Chest: Tan, brown macules and papules, uniformly pigmented and symmetrical. Upper chest waxy hyperkeratotic stuck on papules.   Under breast: 1-2mm red dome shaped papules. Waxy stuck on hyperkeratotic  and knees and trace at the ankles and she has downgoing toes.    Cranial nerves are unremarkable.           Assessment & Plan   Mrs. Radha Smith is a 63-year-old woman with a history of multiple sclerosis since 1997.  He is likely taken to Sabri for 15 years and has not had active disease for some time.  I again discussed the risk of progressive multifocal ongoing cephalopathy with the patient and her  and offered her the option of switching to an oral drug but she is reluctant to do that as she feels she is doing well to celebrate.  She is scheduled for repeat FLACO virus antibody in 2 weeks.  Assuming her negative she will call in the medication can be reordered on the computer.  I will see her back in 6 months.     Assessment & Plan  Multiple sclerosis (HCC)                        papules.  Back:  Multiple Waxy stuck on hyperkeratotic papules.Tan, brown macules and papules, uniformly pigmented and symmetrical in areas of sun distribution.  Abdomen: upper abdomen brown waxy hyperkeratotic stuck on papule  Groin/buttocks:  Rt groin with a Stuck on waxy keratotic papule.  Right Upper Extremities:  1 red dome shaped papule. Tan to brown macules and papules, uniformly pigmented and symmetrical   Axilla: LT and RT .skin colored pedunculated papules   Left Upper Extremities:  Tan to brown macules and papules, uniformly pigmented and symmetrical   Right Lower Extremities: Calf with 1 macule, uniformly pigmented and symmetrical.  Left Lower Extremities:   Calf with 1 macule, uniformly pigmented and symmetrical.      ASSESSMENT AND PLAN:  Skin cancer screening   Discussed with patient no lesions concerning for NMSC (non-melanoma skin cancer) or other skin cancers.   Suggest sunscreens, monthly self-examinations, and yearly total skin exam:  May 2024      Multiple melanocytic nevi benign  Benign moles on today's exam.  Please monitor your moles once monthly for any:  Asymmetry  Border irregularity  Color change or multiple colors  Diameter that is changing  Evolution (any change at all)   If any of the moles concern you please do not hesitate to call our office so that we may reassess the moles.  Also feel free to call our office to reassess any new moles that you notice.  Wear an SPF of at least 30 daily and especially during any sun exposure.    Minimize sun exposure between the hours of 10 AM and 3 PM, when the sun's rays are the strongest    Seborrheic Keratosis - face, abdomen, chest,back, Rt groin fold  Stuck-on hyperkeratotic, tan waxy papules.  Discussed with patient lesion benign, no concern for malignancy today.  Call if change.    Angioma - chest  Uniform dome-shaped 1-2 mm red papules.  Discussed benign nature of lesions.  Follow.  Call if change.    Lentigo - back  Tan macules scattered in  photodistribution areas.  Follow.  Call if change.    Adult Acne- Face  Start  - clindamycin (Cleocin-T) 1 % lotion; Use to apply to the entire face in the AM and follow with sun screen.  Dispense: 60 mL; Refill: 1  -Tretinoin (RETIN-A) 0.1 % cream; Use to apply to the entire face. Avoid corners or the nose and mouth. Avoid right under the eye  Region. Dispense 45 g Refill 0  -Discussed use of a OTC face wash in the AM and PM, lather, leave on 5 minutes then rinse.    Follow up in 6 months for face evaluation and possible PDT if needed and 1 year for skin exam or Call sooner if any problems or concerns.     On 5/24/2023, Annmarie HERNDON Lawrence, MA scribed the services personally performed by MD KATRINA Jenkins Cynthia Y Abban, MD, PhD , on 5/24/2023, attest that I performed all of the work during this encounter and that the scribe only recorded my findings.

## 2024-10-04 ENCOUNTER — APPOINTMENT (OUTPATIENT)
Dept: ONCOLOGY | Facility: MEDICAL CENTER | Age: 64
End: 2024-10-04
Payer: MEDICARE

## 2024-10-13 ENCOUNTER — OUTPATIENT INFUSION SERVICES (OUTPATIENT)
Dept: ONCOLOGY | Facility: MEDICAL CENTER | Age: 64
End: 2024-10-13
Attending: SPECIALIST
Payer: MEDICARE

## 2024-10-13 VITALS
BODY MASS INDEX: 40.58 KG/M2 | HEART RATE: 88 BPM | SYSTOLIC BLOOD PRESSURE: 131 MMHG | RESPIRATION RATE: 18 BRPM | WEIGHT: 214.95 LBS | DIASTOLIC BLOOD PRESSURE: 73 MMHG | HEIGHT: 61 IN | TEMPERATURE: 97 F | OXYGEN SATURATION: 96 %

## 2024-10-13 DIAGNOSIS — G35 MULTIPLE SCLEROSIS (HCC): ICD-10-CM

## 2024-10-13 PROCEDURE — 700105 HCHG RX REV CODE 258: Performed by: SPECIALIST

## 2024-10-13 PROCEDURE — 700111 HCHG RX REV CODE 636 W/ 250 OVERRIDE (IP): Mod: JZ,JG | Performed by: SPECIALIST

## 2024-10-13 PROCEDURE — 96365 THER/PROPH/DIAG IV INF INIT: CPT

## 2024-10-13 RX ORDER — 0.9 % SODIUM CHLORIDE 0.9 %
10 VIAL (ML) INJECTION PRN
OUTPATIENT
Start: 2024-11-24

## 2024-10-13 RX ORDER — 0.9 % SODIUM CHLORIDE 0.9 %
VIAL (ML) INJECTION PRN
OUTPATIENT
Start: 2024-11-24

## 2024-10-13 RX ORDER — 0.9 % SODIUM CHLORIDE 0.9 %
3 VIAL (ML) INJECTION PRN
OUTPATIENT
Start: 2024-11-24

## 2024-10-13 RX ORDER — SODIUM CHLORIDE 9 MG/ML
INJECTION, SOLUTION INTRAVENOUS CONTINUOUS
OUTPATIENT
Start: 2024-11-24

## 2024-10-13 RX ORDER — DIPHENHYDRAMINE HYDROCHLORIDE 50 MG/ML
50 INJECTION INTRAMUSCULAR; INTRAVENOUS PRN
OUTPATIENT
Start: 2024-11-24

## 2024-10-13 RX ORDER — METHYLPREDNISOLONE SODIUM SUCCINATE 125 MG/2ML
125 INJECTION, POWDER, LYOPHILIZED, FOR SOLUTION INTRAMUSCULAR; INTRAVENOUS PRN
OUTPATIENT
Start: 2024-11-24

## 2024-10-13 RX ORDER — EPINEPHRINE 1 MG/ML(1)
0.5 AMPUL (ML) INJECTION PRN
OUTPATIENT
Start: 2024-11-24

## 2024-10-13 RX ADMIN — NATALIZUMAB 300 MG: 300 INJECTION INTRAVENOUS at 10:44

## 2024-10-13 ASSESSMENT — FIBROSIS 4 INDEX: FIB4 SCORE: 0.93

## 2024-10-14 DIAGNOSIS — G25.81 RESTLESS LEGS: ICD-10-CM

## 2024-10-14 RX ORDER — ROPINIROLE 0.25 MG/1
0.25 TABLET, FILM COATED ORAL 2 TIMES DAILY
Qty: 60 TABLET | Refills: 11 | Status: SHIPPED | OUTPATIENT
Start: 2024-10-14

## 2024-10-15 RX ORDER — ROPINIROLE 0.25 MG/1
0.25 TABLET, FILM COATED ORAL 2 TIMES DAILY
Qty: 60 TABLET | Refills: 11 | Status: SHIPPED | OUTPATIENT
Start: 2024-10-15

## 2024-11-24 ENCOUNTER — OUTPATIENT INFUSION SERVICES (OUTPATIENT)
Dept: ONCOLOGY | Facility: MEDICAL CENTER | Age: 64
End: 2024-11-24
Attending: SPECIALIST
Payer: MEDICARE

## 2024-11-24 VITALS
HEIGHT: 61 IN | TEMPERATURE: 97.3 F | SYSTOLIC BLOOD PRESSURE: 128 MMHG | WEIGHT: 220.02 LBS | OXYGEN SATURATION: 94 % | DIASTOLIC BLOOD PRESSURE: 74 MMHG | HEART RATE: 77 BPM | BODY MASS INDEX: 41.54 KG/M2 | RESPIRATION RATE: 18 BRPM

## 2024-11-24 DIAGNOSIS — G35 MULTIPLE SCLEROSIS (HCC): ICD-10-CM

## 2024-11-24 PROCEDURE — 96365 THER/PROPH/DIAG IV INF INIT: CPT

## 2024-11-24 PROCEDURE — 700105 HCHG RX REV CODE 258: Performed by: SPECIALIST

## 2024-11-24 PROCEDURE — 700111 HCHG RX REV CODE 636 W/ 250 OVERRIDE (IP): Mod: JZ,JG | Performed by: SPECIALIST

## 2024-11-24 RX ORDER — 0.9 % SODIUM CHLORIDE 0.9 %
3 VIAL (ML) INJECTION PRN
OUTPATIENT
Start: 2025-01-05

## 2024-11-24 RX ORDER — 0.9 % SODIUM CHLORIDE 0.9 %
VIAL (ML) INJECTION PRN
OUTPATIENT
Start: 2025-01-05

## 2024-11-24 RX ORDER — 0.9 % SODIUM CHLORIDE 0.9 %
10 VIAL (ML) INJECTION PRN
OUTPATIENT
Start: 2025-01-05

## 2024-11-24 RX ORDER — SODIUM CHLORIDE 9 MG/ML
INJECTION, SOLUTION INTRAVENOUS CONTINUOUS
OUTPATIENT
Start: 2025-01-05

## 2024-11-24 RX ORDER — DIPHENHYDRAMINE HYDROCHLORIDE 50 MG/ML
50 INJECTION INTRAMUSCULAR; INTRAVENOUS PRN
OUTPATIENT
Start: 2025-01-05

## 2024-11-24 RX ORDER — EPINEPHRINE 1 MG/ML(1)
0.5 AMPUL (ML) INJECTION PRN
OUTPATIENT
Start: 2025-01-05

## 2024-11-24 RX ORDER — METHYLPREDNISOLONE SODIUM SUCCINATE 125 MG/2ML
125 INJECTION, POWDER, LYOPHILIZED, FOR SOLUTION INTRAMUSCULAR; INTRAVENOUS PRN
OUTPATIENT
Start: 2025-01-05

## 2024-11-24 RX ADMIN — NATALIZUMAB 300 MG: 300 INJECTION INTRAVENOUS at 09:34

## 2024-11-24 ASSESSMENT — FIBROSIS 4 INDEX: FIB4 SCORE: 0.94

## 2024-11-24 NOTE — PROGRESS NOTES
Patient to [facility] for Tysabri infusion. Touch program completed. PIV inserted into right side of AC, flushed with + blood return. Tysabri infused with no s/s of infusion reaction. PIV flushed with + blood return and removed. Patient has future appts. Patient to home.

## 2025-01-05 ENCOUNTER — OUTPATIENT INFUSION SERVICES (OUTPATIENT)
Dept: ONCOLOGY | Facility: MEDICAL CENTER | Age: 65
End: 2025-01-05
Attending: SPECIALIST
Payer: MEDICARE

## 2025-01-05 VITALS
DIASTOLIC BLOOD PRESSURE: 74 MMHG | HEART RATE: 94 BPM | RESPIRATION RATE: 18 BRPM | HEIGHT: 61 IN | BODY MASS INDEX: 41.87 KG/M2 | OXYGEN SATURATION: 94 % | SYSTOLIC BLOOD PRESSURE: 104 MMHG | TEMPERATURE: 98 F | WEIGHT: 221.78 LBS

## 2025-01-05 DIAGNOSIS — G35 MULTIPLE SCLEROSIS (HCC): ICD-10-CM

## 2025-01-05 PROCEDURE — 700105 HCHG RX REV CODE 258: Performed by: SPECIALIST

## 2025-01-05 PROCEDURE — 700111 HCHG RX REV CODE 636 W/ 250 OVERRIDE (IP): Mod: JZ,TB | Performed by: SPECIALIST

## 2025-01-05 PROCEDURE — 96365 THER/PROPH/DIAG IV INF INIT: CPT

## 2025-01-05 RX ORDER — METHYLPREDNISOLONE SODIUM SUCCINATE 125 MG/2ML
125 INJECTION, POWDER, LYOPHILIZED, FOR SOLUTION INTRAMUSCULAR; INTRAVENOUS PRN
OUTPATIENT
Start: 2025-02-16

## 2025-01-05 RX ORDER — 0.9 % SODIUM CHLORIDE 0.9 %
3 VIAL (ML) INJECTION PRN
OUTPATIENT
Start: 2025-02-16

## 2025-01-05 RX ORDER — 0.9 % SODIUM CHLORIDE 0.9 %
10 VIAL (ML) INJECTION PRN
OUTPATIENT
Start: 2025-02-16

## 2025-01-05 RX ORDER — EPINEPHRINE 1 MG/ML(1)
0.5 AMPUL (ML) INJECTION PRN
OUTPATIENT
Start: 2025-02-16

## 2025-01-05 RX ORDER — SODIUM CHLORIDE 9 MG/ML
INJECTION, SOLUTION INTRAVENOUS CONTINUOUS
OUTPATIENT
Start: 2025-02-16

## 2025-01-05 RX ORDER — DIPHENHYDRAMINE HYDROCHLORIDE 50 MG/ML
50 INJECTION INTRAMUSCULAR; INTRAVENOUS PRN
OUTPATIENT
Start: 2025-02-16

## 2025-01-05 RX ORDER — 0.9 % SODIUM CHLORIDE 0.9 %
VIAL (ML) INJECTION PRN
OUTPATIENT
Start: 2025-02-16

## 2025-01-05 RX ADMIN — NATALIZUMAB 300 MG: 300 INJECTION INTRAVENOUS at 08:53

## 2025-01-05 ASSESSMENT — FIBROSIS 4 INDEX: FIB4 SCORE: 0.94

## 2025-01-05 NOTE — PROGRESS NOTES
Pt arrived ambulatory for Q6 week Tysabri, denies c/o, states she has been stable since last visit.  Touch prescribing enrollment confirmed, completed checklist, okay for infusion.  PIV started in RAC with good blood return, Tysabri infused over 60 minutes without any s/s reaction noted.  Pt Dc'd home without incident and will f/u as scheduled, printout of appts given.

## 2025-02-16 ENCOUNTER — APPOINTMENT (OUTPATIENT)
Dept: ONCOLOGY | Facility: MEDICAL CENTER | Age: 65
End: 2025-02-16
Attending: SPECIALIST
Payer: MEDICARE

## 2025-03-17 ENCOUNTER — APPOINTMENT (OUTPATIENT)
Dept: NEUROLOGY | Facility: MEDICAL CENTER | Age: 65
End: 2025-03-17
Attending: SPECIALIST
Payer: MEDICARE

## 2025-06-18 ENCOUNTER — TELEPHONE (OUTPATIENT)
Dept: NEUROLOGY | Facility: MEDICAL CENTER | Age: 65
End: 2025-06-18
Payer: COMMERCIAL

## 2025-06-18 NOTE — TELEPHONE ENCOUNTER
Patient called stating she needs Tysabri orders. I lvm for pt to see where she gets her infusion done.  Delroy,  Raúl Chapman, Med Ass't

## 2025-07-03 ENCOUNTER — TELEPHONE (OUTPATIENT)
Dept: NEUROLOGY | Facility: MEDICAL CENTER | Age: 65
End: 2025-07-03
Payer: COMMERCIAL

## 2025-07-15 ENCOUNTER — PATIENT MESSAGE (OUTPATIENT)
Dept: NEUROLOGY | Facility: MEDICAL CENTER | Age: 65
End: 2025-07-15
Payer: COMMERCIAL

## 2025-07-21 ENCOUNTER — PATIENT MESSAGE (OUTPATIENT)
Dept: NEUROLOGY | Facility: MEDICAL CENTER | Age: 65
End: 2025-07-21
Payer: COMMERCIAL

## 2025-07-21 DIAGNOSIS — G25.81 RESTLESS LEGS: ICD-10-CM

## 2025-07-22 RX ORDER — ROPINIROLE 0.25 MG/1
0.25 TABLET, FILM COATED ORAL 2 TIMES DAILY
Qty: 60 TABLET | Refills: 11 | Status: SHIPPED | OUTPATIENT
Start: 2025-07-22

## 2025-07-22 NOTE — PATIENT COMMUNICATION
Received request via: Patient    Medication Name/Dosage Ropinirole 0.25mg    When was medication last prescribed 10/15/24    How many refills were previously provided 11    How many Refills does he patient have left from last prescription 0- prior Camille patient    Was the patient seen in the last year in this department? YES   Date of last office visit 9/16/24     Per last Neurology Office Visit, when was the date of next follow up visit set for?                            Date of office visit follow up request 3/16/25     Does the patient have an upcoming appointment? YES   If yes, when 7/31/25             If no, schedule appointment -    Does the patient have custodial Plus and need 100 day supply (blood pressure, diabetes and cholesterol meds only)? Patient does not have SCP

## 2025-07-24 ENCOUNTER — TELEPHONE (OUTPATIENT)
Dept: ONCOLOGY | Facility: MEDICAL CENTER | Age: 65
End: 2025-07-24
Payer: COMMERCIAL

## 2025-07-30 ENCOUNTER — HOSPITAL ENCOUNTER (OUTPATIENT)
Dept: RADIOLOGY | Facility: MEDICAL CENTER | Age: 65
End: 2025-07-30

## 2025-07-31 ENCOUNTER — OFFICE VISIT (OUTPATIENT)
Dept: NEUROLOGY | Facility: MEDICAL CENTER | Age: 65
End: 2025-07-31
Attending: PSYCHIATRY & NEUROLOGY
Payer: MEDICARE

## 2025-07-31 VITALS
HEIGHT: 59 IN | HEART RATE: 74 BPM | WEIGHT: 219.8 LBS | TEMPERATURE: 98.2 F | RESPIRATION RATE: 14 BRPM | BODY MASS INDEX: 44.31 KG/M2 | SYSTOLIC BLOOD PRESSURE: 118 MMHG | OXYGEN SATURATION: 98 % | DIASTOLIC BLOOD PRESSURE: 78 MMHG

## 2025-07-31 DIAGNOSIS — G47.19 EXCESSIVE DAYTIME SLEEPINESS: Primary | ICD-10-CM

## 2025-07-31 PROCEDURE — 99214 OFFICE O/P EST MOD 30 MIN: CPT | Performed by: PSYCHIATRY & NEUROLOGY

## 2025-07-31 ASSESSMENT — PATIENT HEALTH QUESTIONNAIRE - PHQ9
SUM OF ALL RESPONSES TO PHQ QUESTIONS 1-9: 16
5. POOR APPETITE OR OVEREATING: 2 - MORE THAN HALF THE DAYS
CLINICAL INTERPRETATION OF PHQ2 SCORE: 2

## 2025-07-31 ASSESSMENT — FIBROSIS 4 INDEX: FIB4 SCORE: 0.98

## 2025-07-31 NOTE — PROGRESS NOTES
"[facility] NEUROLOGY  MULTIPLE SCLEROSIS & NEUROIMMUNOLOGY  NEW PATIENT VISIT    DISEASE SUMMARY:  Principal neurologic diagnosis: MS  Diagnosis of MS: 1997  Disease History:  - 1995: episode of sensory symptoms (numbness) involving the lower extremities, no advanced workup  - 1997: episode of optic neuritis (?right), consulted with a neurologist, workup included MRI, diagnosed w/ MS  - 1997: tried bee-sting therapy, ineffective  - 1997: started Avonex  - 1998: transitioned to Rebif because of new ?lesions  - 2006: switched to Tysabri  - 2020: switched to q5 week dosing  - [id number]: switched to q6 week dosing  Disease course at onset: RRMS  Current disease course: SPMS w/o activity  Previous disease therapies:  - Avonex  - Rebif  Current disease therapies:  - Tysabri:   Symptomatic therapies:  - gabapentin: 300 mg BID for sensory symptoms (felt \"frozen\") in the distal lower extremities  CSF:  - ?  Other Testing:  - JCV Ab: 0.11 on 7/152025,   MRI head:  - 4/9/2024: \"the overall plaque burden appears increased particularly in the periventricular region... 2 new small foci demonstrated affecting the left medulla and brachium pontis of the left cerebellar hemisphere...\"  -7/18/2023: \"\"  - 6/30/2022:   - 9/12/2019: \"no appreciable new lesion or active enhancing lesion... tiny 3 mm focus of possible enhancement in the left paramedian lower mariano...\"  - 6/5/2018: \"advanced supratentorial white matter disease... no active enhancing lesion...\"  MRI cervical spine:  - 7/8/2023: \"\"  - 6/30/2022: \"unremarkable...\"  - 6/5/2018: \"no abnormal intramedullary T2 signal intensity...\"  MRI thoracic spine:  -   Immunizations:  - influenza?:   - Pneumonia?:  - SARS-CoV-2?:   Cancer Screens:  - mammogram:   - PAP?:   - skin check:     CC: MS    HISTORY OF ILLNESS:  Radha Smith is a 64 y.o. woman with a history most notable for MS, neurogenic bladder, HTN, and anxiety.  Today, she was accompanied by her , and she provided the " "following history:    I have summarized pertinent data above.    A review of MS-related symptoms was notable for the following:    Fatigue: yes; Provigil was ineffective, other medications were ineffective; sleep is \"horrible,\" doesn't snore a lot per , doesn't wake feeling well-rested  Weakness:   Numbness:   Incoordination:   Spasms/Spasticity:   Vision Impairment:   Walking/Balance Problems:   Neuralgia:   Bowel Symptoms:   Bladder Symptoms:   Heat Sensitivity:   Depression:   Cognitive/Memory Problems: yes; memory seems to be getting worse  Sexual Dysfunction:   Anxiety:     MEDICAL AND SURGICAL HISTORY:  Past Medical History[1]  Past Surgical History[2]    MEDICATIONS:  Current Outpatient Medications   Medication Sig    ROPINIRole (REQUIP) 0.25 MG Tab Take 1 Tablet by mouth 2 times a day.    ROPINIRole (REQUIP) 0.25 MG Tab Take 1 Tablet by mouth 2 times a day.    natalizumab (TYSABRI) 300 MG/15ML Conc Infuse 15 mL into a venous catheter every 30 days.    natalizumab (TYSABRI) 300 MG/15ML Conc Infuse 15 mL into a venous catheter see administration instructions.    natalizumab (TYSABRI) 300 MG/15ML Conc Infuse 15 mL into a venous catheter every 30 days.    natalizumab (TYSABRI) 300 MG/15ML Conc Infuse 15 mL into a venous catheter see administration instructions. Pt receives every 6 weeks, last dose taken on 12/21/2023    acetaminophen (TYLENOL) 500 MG Tab Take 1,000 mg by mouth every 6 hours as needed. Indications: Pain    Cholecalciferol (D3 PO) Take 1 Capsule by mouth every evening.    gabapentin (NEURONTIN) 300 MG Cap Take 300 mg by mouth 2 times a day.    buPROPion SR (WELLBUTRIN-SR) 150 MG TABLET SR 12 HR sustained-release tablet Take 150 mg by mouth every day. Pt takes only once a day, not BID    lisinopril (PRINIVIL) 40 MG tablet Take 40 mg by mouth every day.    BREO ELLIPTA 200-25 MCG/INH AEROSOL POWDER, BREATH ACTIVATED Inhale 1 Puff every day. Indications: Asthma    VENTOLIN  (90 Base) " MCG/ACT Aero Soln inhalation aerosol Inhale 2 Puffs every four hours as needed for Shortness of Breath. Indications: Asthma    triamterene/hctz (MAXZIDE-25/DYAZIDE) 37.5-25 MG Cap Take 1 Capsule by mouth every day.    Multiple Vitamin (MULTI-VITAMINS) Tab Take 1 Tablet by mouth every day.    sertraline (ZOLOFT) 100 MG Tab Take 100 mg by mouth every day.    Ascorbic Acid (VITAMIN C PO) Take 1 Tablet by mouth every day. (Patient not taking: Reported on 7/31/2025)     SOCIAL HISTORY:  Social History     Tobacco Use    Smoking status: Some Days     Types: Cigarettes    Smokeless tobacco: Never    Tobacco comments:     Previously smoked , only smokes when gambling, not for last 2 weeks   Substance Use Topics    Alcohol use: Yes     Comment: occ     Social History     Social History Narrative    Not on file     FAMILY HISTORY:  Family History   Problem Relation Age of Onset    Cancer Mother         Breast    Diabetes Mother     Other Father         Gout    Hypertension Brother     Bipolar disorder Son      REVIEW OF SYSTEMS:  A ROS was completed.  Pertinent positives and negatives were included in the HPI, above.  All other systems were reviewed and are negative.    PHYSICAL EXAM:  General/Medical:  - NAD  - hair, skin, nails, and joints were normal    Neuro:  MENTAL STATUS: awake and alert; no deficits of speech or language; oriented to conversation; affect was appropriate to situation    CRANIAL NERVES:    II: acuity: J16/J10, fields: intact to confrontation, pupils: 3/3 to 2/2 without a relative afferent pupillary defect, discs: sharp    III/IV/VI: versions: intact without nystagmus    V: facial sensation: symmetric to light touch    VII: facial expression: symmetric    VIII: hearing: intact to finger rub    IX/X: palate: elevates symmetrically    XI: shoulder shrug: symmetric    XII: tongue: midline    MOTOR:  - bulk: normal throughout  - tone: NT  Upper Extremity Strength  (R/L)    5/5   Elbow flexion 5/5   Elbow  "extension 5/5   Shoulder abduction 5/5     Lower Extremity Strength  (R/L)   Hip flexion 5/5   Knee extension 4/5   Knee flexion 4/4   Ankle dorsiflexion NT   Ankle plantarflexion NT     - heel-walk: difficulty  - toe-walk: difficulty  - pronator drift: absent  - abnormal movements: none    SENSATION:  - light touch: reduced over the right lower extremity  - vibration (R/L, seconds): NT/NT at the great toes  - pinprick: NT  - proprioception: NT  - Romberg: absent    COORDINATION:  - finger to nose: normal, no ataxia on exam  - finger tapping: rapid and accurate, bilaterally    REFLEXES:  Reflex Right Left   BR 2+ 2+   Biceps 2+ brisk 2+ brisk   Triceps 2+ 2+   Patellae 2+ brisk 2+ brisk   Achilles 2+ 2+   Toes mute mute     GAIT:  - narrow base and normal  - tandem gait: intact    QUANTITATIVE SCORES:  Timed 25-foot walk (sec): NT today.  Assistive device: none    REVIEW OF IMAGING STUDIES:   I have summarized available data above.    REVIEW OF LABORATORY STUDIES:  I have summarized pertinent data above.    ASSESSMENT:  Radha Smith is a 64 y.o. woman with MS as well as neurogenic bladder, HTN, and anxiety.  Plans/recommendations as follows;    PLAN:  MS:  - continue Tysabri q6 weeks  - JCV Ab testing through [facility] every 3-4 months    Follow-Up:  - Return in about 6 months (around 1/31/2026).    Signed: Keith Domingo M.D.    BILLING DOCUMENTATION:   I spent 54 minutes reviewing the medical record, interviewing and examining the patient, discussing my impression (see \"assessment\" above), and coordinating care.       [1]   Past Medical History:  Diagnosis Date    Asthma     Bronchitis     Chest pain 1/17/2024    Depression     Heart burn     High cholesterol     slightly elevated    Hypertension     Infectious disease     covid 2022    Multiple sclerosis (HCC)     PONV (postoperative nausea and vomiting)     Renal disorder     ckd    Snoring    [2]   Past Surgical History:  Procedure Laterality Date    KNEE " ARTHROSCOPY Left 6/7/2023    Procedure: LEFT KNEE ARTHROSCOPY, PARTIAL MENISCECTOMY, CHONDROPLASTY;  Surgeon: Quique Ghotra M.D.;  Location: [facility];  Service: Orthopedics    CHOLECYSTECTOMY      KNEE ARTHROSCOPY Right     PRIMARY C SECTION

## 2025-08-05 ENCOUNTER — TELEPHONE (OUTPATIENT)
Dept: NEUROLOGY | Facility: MEDICAL CENTER | Age: 65
End: 2025-08-05
Payer: MEDICARE

## 2025-08-14 ENCOUNTER — PATIENT MESSAGE (OUTPATIENT)
Dept: NEUROLOGY | Facility: MEDICAL CENTER | Age: 65
End: 2025-08-14
Payer: MEDICARE